# Patient Record
Sex: MALE | Race: BLACK OR AFRICAN AMERICAN | NOT HISPANIC OR LATINO
[De-identification: names, ages, dates, MRNs, and addresses within clinical notes are randomized per-mention and may not be internally consistent; named-entity substitution may affect disease eponyms.]

---

## 2018-05-24 PROBLEM — Z00.00 ENCOUNTER FOR PREVENTIVE HEALTH EXAMINATION: Status: ACTIVE | Noted: 2018-05-24

## 2018-06-11 ENCOUNTER — APPOINTMENT (OUTPATIENT)
Dept: ELECTROPHYSIOLOGY | Facility: CLINIC | Age: 58
End: 2018-06-11
Payer: COMMERCIAL

## 2018-06-11 VITALS — OXYGEN SATURATION: 99 % | BODY MASS INDEX: 28.72 KG/M2 | RESPIRATION RATE: 16 BRPM | WEIGHT: 231 LBS | HEIGHT: 75 IN

## 2018-06-11 PROCEDURE — 93282 PRGRMG EVAL IMPLANTABLE DFB: CPT

## 2018-06-11 PROCEDURE — 99205 OFFICE O/P NEW HI 60 MIN: CPT

## 2018-09-17 ENCOUNTER — APPOINTMENT (OUTPATIENT)
Dept: ELECTROPHYSIOLOGY | Facility: CLINIC | Age: 58
End: 2018-09-17
Payer: COMMERCIAL

## 2018-09-17 PROCEDURE — 93290 INTERROG DEV EVAL ICPMS IP: CPT

## 2018-09-17 PROCEDURE — 93282 PRGRMG EVAL IMPLANTABLE DFB: CPT

## 2018-12-17 ENCOUNTER — APPOINTMENT (OUTPATIENT)
Dept: ELECTROPHYSIOLOGY | Facility: CLINIC | Age: 58
End: 2018-12-17
Payer: COMMERCIAL

## 2018-12-17 PROCEDURE — 93295 DEV INTERROG REMOTE 1/2/MLT: CPT

## 2018-12-17 PROCEDURE — 93296 REM INTERROG EVL PM/IDS: CPT

## 2019-03-18 ENCOUNTER — APPOINTMENT (OUTPATIENT)
Dept: ELECTROPHYSIOLOGY | Facility: CLINIC | Age: 59
End: 2019-03-18
Payer: COMMERCIAL

## 2019-03-18 PROCEDURE — 93282 PRGRMG EVAL IMPLANTABLE DFB: CPT

## 2019-06-27 ENCOUNTER — APPOINTMENT (OUTPATIENT)
Dept: ELECTROPHYSIOLOGY | Facility: CLINIC | Age: 59
End: 2019-06-27
Payer: COMMERCIAL

## 2019-06-27 PROCEDURE — 93295 DEV INTERROG REMOTE 1/2/MLT: CPT

## 2019-06-27 PROCEDURE — 93296 REM INTERROG EVL PM/IDS: CPT

## 2019-09-30 ENCOUNTER — APPOINTMENT (OUTPATIENT)
Dept: ELECTROPHYSIOLOGY | Facility: CLINIC | Age: 59
End: 2019-09-30
Payer: COMMERCIAL

## 2019-09-30 PROCEDURE — 93282 PRGRMG EVAL IMPLANTABLE DFB: CPT

## 2019-12-31 ENCOUNTER — APPOINTMENT (OUTPATIENT)
Dept: ELECTROPHYSIOLOGY | Facility: CLINIC | Age: 59
End: 2019-12-31
Payer: COMMERCIAL

## 2019-12-31 PROCEDURE — 93295 DEV INTERROG REMOTE 1/2/MLT: CPT

## 2019-12-31 PROCEDURE — 93296 REM INTERROG EVL PM/IDS: CPT

## 2020-02-13 ENCOUNTER — APPOINTMENT (OUTPATIENT)
Dept: ELECTROPHYSIOLOGY | Facility: CLINIC | Age: 60
End: 2020-02-13
Payer: COMMERCIAL

## 2020-02-13 PROCEDURE — 93282 PRGRMG EVAL IMPLANTABLE DFB: CPT

## 2020-04-06 ENCOUNTER — APPOINTMENT (OUTPATIENT)
Dept: ELECTROPHYSIOLOGY | Facility: CLINIC | Age: 60
End: 2020-04-06

## 2020-05-13 ENCOUNTER — APPOINTMENT (OUTPATIENT)
Dept: ELECTROPHYSIOLOGY | Facility: CLINIC | Age: 60
End: 2020-05-13
Payer: COMMERCIAL

## 2020-05-13 PROCEDURE — 93295 DEV INTERROG REMOTE 1/2/MLT: CPT

## 2020-05-13 PROCEDURE — 93296 REM INTERROG EVL PM/IDS: CPT

## 2020-06-29 ENCOUNTER — APPOINTMENT (OUTPATIENT)
Dept: ELECTROPHYSIOLOGY | Facility: CLINIC | Age: 60
End: 2020-06-29

## 2020-08-17 ENCOUNTER — APPOINTMENT (OUTPATIENT)
Dept: ELECTROPHYSIOLOGY | Facility: CLINIC | Age: 60
End: 2020-08-17
Payer: COMMERCIAL

## 2020-08-17 PROCEDURE — 93296 REM INTERROG EVL PM/IDS: CPT

## 2020-08-17 PROCEDURE — 93295 DEV INTERROG REMOTE 1/2/MLT: CPT

## 2020-11-03 ENCOUNTER — INPATIENT (INPATIENT)
Facility: HOSPITAL | Age: 60
LOS: 3 days | Discharge: HOME | End: 2020-11-07
Attending: STUDENT IN AN ORGANIZED HEALTH CARE EDUCATION/TRAINING PROGRAM | Admitting: STUDENT IN AN ORGANIZED HEALTH CARE EDUCATION/TRAINING PROGRAM
Payer: COMMERCIAL

## 2020-11-03 VITALS
TEMPERATURE: 98 F | HEART RATE: 67 BPM | HEIGHT: 72 IN | WEIGHT: 229.94 LBS | SYSTOLIC BLOOD PRESSURE: 138 MMHG | OXYGEN SATURATION: 100 % | RESPIRATION RATE: 16 BRPM | DIASTOLIC BLOOD PRESSURE: 49 MMHG

## 2020-11-03 DIAGNOSIS — Z01.84 ENCOUNTER FOR ANTIBODY RESPONSE EXAMINATION: ICD-10-CM

## 2020-11-03 DIAGNOSIS — R76.0 RAISED ANTIBODY TITER: ICD-10-CM

## 2020-11-03 DIAGNOSIS — K42.9 UMBILICAL HERNIA WITHOUT OBSTRUCTION OR GANGRENE: ICD-10-CM

## 2020-11-03 DIAGNOSIS — Z86.19 PERSONAL HISTORY OF OTHER INFECTIOUS AND PARASITIC DISEASES: ICD-10-CM

## 2020-11-03 DIAGNOSIS — I50.9 HEART FAILURE, UNSPECIFIED: ICD-10-CM

## 2020-11-03 DIAGNOSIS — N17.9 ACUTE KIDNEY FAILURE, UNSPECIFIED: ICD-10-CM

## 2020-11-03 DIAGNOSIS — Z95.810 PRESENCE OF AUTOMATIC (IMPLANTABLE) CARDIAC DEFIBRILLATOR: ICD-10-CM

## 2020-11-03 DIAGNOSIS — Z95.0 PRESENCE OF CARDIAC PACEMAKER: Chronic | ICD-10-CM

## 2020-11-03 DIAGNOSIS — K35.30 ACUTE APPENDICITIS WITH LOCALIZED PERITONITIS, WITHOUT PERFORATION OR GANGRENE: ICD-10-CM

## 2020-11-03 DIAGNOSIS — I25.5 ISCHEMIC CARDIOMYOPATHY: ICD-10-CM

## 2020-11-03 DIAGNOSIS — R10.9 UNSPECIFIED ABDOMINAL PAIN: ICD-10-CM

## 2020-11-03 LAB
ALBUMIN SERPL ELPH-MCNC: 3.8 G/DL — SIGNIFICANT CHANGE UP (ref 3.3–5)
ALP SERPL-CCNC: 36 U/L — LOW (ref 40–120)
ALT FLD-CCNC: 27 U/L — SIGNIFICANT CHANGE UP (ref 12–78)
ANION GAP SERPL CALC-SCNC: 6 MMOL/L — SIGNIFICANT CHANGE UP (ref 5–17)
APTT BLD: 30.2 SEC — SIGNIFICANT CHANGE UP (ref 27.5–35.5)
AST SERPL-CCNC: 40 U/L — HIGH (ref 15–37)
BILIRUB SERPL-MCNC: 0.5 MG/DL — SIGNIFICANT CHANGE UP (ref 0.2–1.2)
BLD GP AB SCN SERPL QL: SIGNIFICANT CHANGE UP
BUN SERPL-MCNC: 10 MG/DL — SIGNIFICANT CHANGE UP (ref 7–23)
CALCIUM SERPL-MCNC: 9.3 MG/DL — SIGNIFICANT CHANGE UP (ref 8.5–10.1)
CHLORIDE SERPL-SCNC: 103 MMOL/L — SIGNIFICANT CHANGE UP (ref 96–108)
CO2 SERPL-SCNC: 27 MMOL/L — SIGNIFICANT CHANGE UP (ref 22–31)
CREAT SERPL-MCNC: 1.04 MG/DL — SIGNIFICANT CHANGE UP (ref 0.5–1.3)
GLUCOSE SERPL-MCNC: 147 MG/DL — HIGH (ref 70–99)
HCT VFR BLD CALC: 32.8 % — LOW (ref 39–50)
HGB BLD-MCNC: 10.6 G/DL — LOW (ref 13–17)
INR BLD: 1.15 RATIO — SIGNIFICANT CHANGE UP (ref 0.88–1.16)
LACTATE SERPL-SCNC: 2 MMOL/L — SIGNIFICANT CHANGE UP (ref 0.7–2)
LIDOCAIN IGE QN: 216 U/L — SIGNIFICANT CHANGE UP (ref 73–393)
MCHC RBC-ENTMCNC: 27.5 PG — SIGNIFICANT CHANGE UP (ref 27–34)
MCHC RBC-ENTMCNC: 32.3 GM/DL — SIGNIFICANT CHANGE UP (ref 32–36)
MCV RBC AUTO: 85.2 FL — SIGNIFICANT CHANGE UP (ref 80–100)
NRBC # BLD: 0 /100 WBCS — SIGNIFICANT CHANGE UP (ref 0–0)
PLATELET # BLD AUTO: 172 K/UL — SIGNIFICANT CHANGE UP (ref 150–400)
POTASSIUM SERPL-MCNC: 4.6 MMOL/L — SIGNIFICANT CHANGE UP (ref 3.5–5.3)
POTASSIUM SERPL-SCNC: 4.6 MMOL/L — SIGNIFICANT CHANGE UP (ref 3.5–5.3)
PROT SERPL-MCNC: 8.4 GM/DL — HIGH (ref 6–8.3)
PROTHROM AB SERPL-ACNC: 13.3 SEC — SIGNIFICANT CHANGE UP (ref 10.6–13.6)
RBC # BLD: 3.85 M/UL — LOW (ref 4.2–5.8)
RBC # FLD: 15 % — HIGH (ref 10.3–14.5)
SARS-COV-2 RNA SPEC QL NAA+PROBE: SIGNIFICANT CHANGE UP
SODIUM SERPL-SCNC: 136 MMOL/L — SIGNIFICANT CHANGE UP (ref 135–145)
WBC # BLD: 8.03 K/UL — SIGNIFICANT CHANGE UP (ref 3.8–10.5)
WBC # FLD AUTO: 8.03 K/UL — SIGNIFICANT CHANGE UP (ref 3.8–10.5)

## 2020-11-03 PROCEDURE — 93010 ELECTROCARDIOGRAM REPORT: CPT

## 2020-11-03 PROCEDURE — 93306 TTE W/DOPPLER COMPLETE: CPT | Mod: 26,59

## 2020-11-03 PROCEDURE — 71045 X-RAY EXAM CHEST 1 VIEW: CPT | Mod: 26

## 2020-11-03 PROCEDURE — 74177 CT ABD & PELVIS W/CONTRAST: CPT | Mod: 26,MA

## 2020-11-03 PROCEDURE — 99285 EMERGENCY DEPT VISIT HI MDM: CPT

## 2020-11-03 RX ORDER — LISINOPRIL 2.5 MG/1
10 TABLET ORAL DAILY
Refills: 0 | Status: DISCONTINUED | OUTPATIENT
Start: 2020-11-03 | End: 2020-11-07

## 2020-11-03 RX ORDER — ONDANSETRON 8 MG/1
4 TABLET, FILM COATED ORAL ONCE
Refills: 0 | Status: COMPLETED | OUTPATIENT
Start: 2020-11-03 | End: 2020-11-03

## 2020-11-03 RX ORDER — PIPERACILLIN AND TAZOBACTAM 4; .5 G/20ML; G/20ML
3.38 INJECTION, POWDER, LYOPHILIZED, FOR SOLUTION INTRAVENOUS ONCE
Refills: 0 | Status: DISCONTINUED | OUTPATIENT
Start: 2020-11-03 | End: 2020-11-03

## 2020-11-03 RX ORDER — ONDANSETRON 8 MG/1
4 TABLET, FILM COATED ORAL EVERY 6 HOURS
Refills: 0 | Status: DISCONTINUED | OUTPATIENT
Start: 2020-11-03 | End: 2020-11-07

## 2020-11-03 RX ORDER — SODIUM CHLORIDE 9 MG/ML
1000 INJECTION INTRAMUSCULAR; INTRAVENOUS; SUBCUTANEOUS ONCE
Refills: 0 | Status: COMPLETED | OUTPATIENT
Start: 2020-11-03 | End: 2020-11-03

## 2020-11-03 RX ORDER — SPIRONOLACTONE 25 MG/1
25 TABLET, FILM COATED ORAL DAILY
Refills: 0 | Status: DISCONTINUED | OUTPATIENT
Start: 2020-11-03 | End: 2020-11-07

## 2020-11-03 RX ORDER — PIPERACILLIN AND TAZOBACTAM 4; .5 G/20ML; G/20ML
3.38 INJECTION, POWDER, LYOPHILIZED, FOR SOLUTION INTRAVENOUS ONCE
Refills: 0 | Status: COMPLETED | OUTPATIENT
Start: 2020-11-03 | End: 2020-11-03

## 2020-11-03 RX ORDER — LISINOPRIL 2.5 MG/1
0 TABLET ORAL
Qty: 0 | Refills: 0 | DISCHARGE

## 2020-11-03 RX ORDER — ACETAMINOPHEN 500 MG
650 TABLET ORAL EVERY 6 HOURS
Refills: 0 | Status: DISCONTINUED | OUTPATIENT
Start: 2020-11-03 | End: 2020-11-07

## 2020-11-03 RX ORDER — CARVEDILOL PHOSPHATE 80 MG/1
12.5 CAPSULE, EXTENDED RELEASE ORAL EVERY 12 HOURS
Refills: 0 | Status: DISCONTINUED | OUTPATIENT
Start: 2020-11-03 | End: 2020-11-07

## 2020-11-03 RX ORDER — FUROSEMIDE 40 MG
20 TABLET ORAL DAILY
Refills: 0 | Status: DISCONTINUED | OUTPATIENT
Start: 2020-11-03 | End: 2020-11-07

## 2020-11-03 RX ORDER — HEPARIN SODIUM 5000 [USP'U]/ML
5000 INJECTION INTRAVENOUS; SUBCUTANEOUS EVERY 12 HOURS
Refills: 0 | Status: DISCONTINUED | OUTPATIENT
Start: 2020-11-03 | End: 2020-11-07

## 2020-11-03 RX ORDER — SODIUM CHLORIDE 9 MG/ML
1000 INJECTION, SOLUTION INTRAVENOUS
Refills: 0 | Status: DISCONTINUED | OUTPATIENT
Start: 2020-11-03 | End: 2020-11-07

## 2020-11-03 RX ORDER — MORPHINE SULFATE 50 MG/1
4 CAPSULE, EXTENDED RELEASE ORAL ONCE
Refills: 0 | Status: DISCONTINUED | OUTPATIENT
Start: 2020-11-03 | End: 2020-11-03

## 2020-11-03 RX ORDER — FENTANYL CITRATE 50 UG/ML
50 INJECTION INTRAVENOUS ONCE
Refills: 0 | Status: DISCONTINUED | OUTPATIENT
Start: 2020-11-03 | End: 2020-11-03

## 2020-11-03 RX ORDER — PIPERACILLIN AND TAZOBACTAM 4; .5 G/20ML; G/20ML
3.38 INJECTION, POWDER, LYOPHILIZED, FOR SOLUTION INTRAVENOUS EVERY 8 HOURS
Refills: 0 | Status: DISCONTINUED | OUTPATIENT
Start: 2020-11-03 | End: 2020-11-07

## 2020-11-03 RX ORDER — CARVEDILOL PHOSPHATE 80 MG/1
0 CAPSULE, EXTENDED RELEASE ORAL
Qty: 0 | Refills: 0 | DISCHARGE

## 2020-11-03 RX ORDER — FUROSEMIDE 40 MG
0 TABLET ORAL
Qty: 0 | Refills: 0 | DISCHARGE

## 2020-11-03 RX ORDER — SPIRONOLACTONE 25 MG/1
0 TABLET, FILM COATED ORAL
Qty: 0 | Refills: 0 | DISCHARGE

## 2020-11-03 RX ADMIN — MORPHINE SULFATE 4 MILLIGRAM(S): 50 CAPSULE, EXTENDED RELEASE ORAL at 13:00

## 2020-11-03 RX ADMIN — SODIUM CHLORIDE 75 MILLILITER(S): 9 INJECTION, SOLUTION INTRAVENOUS at 15:10

## 2020-11-03 RX ADMIN — FENTANYL CITRATE 50 MICROGRAM(S): 50 INJECTION INTRAVENOUS at 14:16

## 2020-11-03 RX ADMIN — HEPARIN SODIUM 5000 UNIT(S): 5000 INJECTION INTRAVENOUS; SUBCUTANEOUS at 17:29

## 2020-11-03 RX ADMIN — Medication 650 MILLIGRAM(S): at 21:53

## 2020-11-03 RX ADMIN — ONDANSETRON 4 MILLIGRAM(S): 8 TABLET, FILM COATED ORAL at 10:39

## 2020-11-03 RX ADMIN — MORPHINE SULFATE 4 MILLIGRAM(S): 50 CAPSULE, EXTENDED RELEASE ORAL at 12:26

## 2020-11-03 RX ADMIN — SODIUM CHLORIDE 50 MILLILITER(S): 9 INJECTION, SOLUTION INTRAVENOUS at 22:53

## 2020-11-03 RX ADMIN — PIPERACILLIN AND TAZOBACTAM 25 GRAM(S): 4; .5 INJECTION, POWDER, LYOPHILIZED, FOR SOLUTION INTRAVENOUS at 21:07

## 2020-11-03 RX ADMIN — PIPERACILLIN AND TAZOBACTAM 200 GRAM(S): 4; .5 INJECTION, POWDER, LYOPHILIZED, FOR SOLUTION INTRAVENOUS at 15:12

## 2020-11-03 RX ADMIN — LISINOPRIL 10 MILLIGRAM(S): 2.5 TABLET ORAL at 18:52

## 2020-11-03 RX ADMIN — SODIUM CHLORIDE 1000 MILLILITER(S): 9 INJECTION INTRAMUSCULAR; INTRAVENOUS; SUBCUTANEOUS at 10:39

## 2020-11-03 RX ADMIN — MORPHINE SULFATE 4 MILLIGRAM(S): 50 CAPSULE, EXTENDED RELEASE ORAL at 11:02

## 2020-11-03 RX ADMIN — Medication 650 MILLIGRAM(S): at 22:53

## 2020-11-03 RX ADMIN — SODIUM CHLORIDE 75 MILLILITER(S): 9 INJECTION, SOLUTION INTRAVENOUS at 21:07

## 2020-11-03 RX ADMIN — CARVEDILOL PHOSPHATE 12.5 MILLIGRAM(S): 80 CAPSULE, EXTENDED RELEASE ORAL at 18:52

## 2020-11-03 RX ADMIN — SPIRONOLACTONE 25 MILLIGRAM(S): 25 TABLET, FILM COATED ORAL at 18:52

## 2020-11-03 RX ADMIN — Medication 20 MILLIGRAM(S): at 18:52

## 2020-11-03 RX ADMIN — MORPHINE SULFATE 4 MILLIGRAM(S): 50 CAPSULE, EXTENDED RELEASE ORAL at 10:40

## 2020-11-03 NOTE — H&P ADULT - ATTENDING COMMENTS
I saw and examined the patient at bedside, who has RLQ tenderness, VSS. I reviewed the laboratory and imaging findings which are consistent with acute appendicitis. However the patient has pertinent history of heart failure, with last reported EF in 20s (workup performed at outside institution). I discussed the findings with the patient and his wife, and at this time the patient would like to pursue non-operative treatment with IV ABx to avoid surgery, however we discussed the fact that should his condition worsen, he will likely require surgery, which given his cardiac history, would likely have to be open. The patient stated he understood, was agreeable to this plan, and all questions from both the patient and his wife were answered.

## 2020-11-03 NOTE — ED PROVIDER NOTE - CARE PLAN
Principal Discharge DX:	Appendicitis  Secondary Diagnosis:	Congestive heart failure, unspecified HF chronicity, unspecified heart failure type

## 2020-11-03 NOTE — H&P ADULT - ASSESSMENT
Patient is a 59 yo male with pmhx CHF with AICD implanted 2013 who presented to the ED with epigastric and diffuse lower abdominal pain x 1 day. Surgery consulted for possible appendicitis. CT showed appendix dilated (up to 1.2 cm), fluid-filled with mild augmented enhancement and mild periappendiceal stranding possible appendicolith, suspicious for acute appendicitis. CT also showed moderate to large stool burden transverse and right colon. Patient is afebrile, VSS, no leukocytosis, no lactic acidosis.     As discussed with Dr. Donis,  - Admit to surgery  - Cardiology consult, Dr. Osborne to see patient   - NPO/IV fluid  - Zosyn  - Serial abdominal exams  - Preop for possible appendectomy should symptoms worsen   - TTE as per cardiology recommendation   - Resume home medications

## 2020-11-03 NOTE — H&P ADULT - NSHPSOCIALHISTORY_GEN_ALL_CORE
Patient lives with wife. Drinks alcohol socially. Denies history of smoking, denies recreational drug use. Patient works in housekeeping.

## 2020-11-03 NOTE — ED PROVIDER NOTE - NSFOLLOWUPINSTRUCTIONS_ED_ALL_ED_FT
EKG with LBBB not meeting sgarbossas criteria. patient with no CP or SOB indicative of ACS. w/u significant for acute appendicitis. d/w surgery will admit for definitive managemnt for acute appendicitis

## 2020-11-03 NOTE — ED PROVIDER NOTE - CLINICAL SUMMARY MEDICAL DECISION MAKING FREE TEXT BOX
Will evaluate for likely intraabdominal pathology, including obstruction and infection w/labs and imaging.

## 2020-11-03 NOTE — ED ADULT NURSE NOTE - OBJECTIVE STATEMENT
pt presents in bed 5 on cardiac monitoring c/o abdominal pain since around 130am. Brought in by wife- woke her up at 3am- - fixed him guido/lemon tea. small emesis around 7am. LBM x2 days. Went to urgent care this am was sent to ED. Denies chest pain, no acute distress noted or reported. PIV present - medicated as ordered. Wife at bedside- tx plan explained. verbalizes understanding

## 2020-11-03 NOTE — H&P ADULT - NSICDXPASTMEDICALHX_GEN_ALL_CORE_FT
PAST MEDICAL HISTORY:  Congestive heart failure, unspecified HF chronicity, unspecified heart failure type

## 2020-11-03 NOTE — ED PROVIDER NOTE - OBJECTIVE STATEMENT
Pt is a 60 year old male w/PMH of CHF w/implanted defibrillator, no prior abdominal surgeries, who presents to the ED for worsening abdominal pain since 3am this morning. Reports LBM x3 days ago. Denies fever/chills, CP, SOB, cough or dysuria.

## 2020-11-03 NOTE — H&P ADULT - NSHPREVIEWOFSYSTEMS_GEN_ALL_CORE
CONSTITUTIONAL: no fever and no chills.    EYES: no discharge, no irritation, no pain, no redness, and no visual changes  ENMT: Ears: no ear pain and no hearing problems. Nose: no nasal congestion and no nasal drainage. Mouth/Throat: no dysphagia, no hoarseness and no throat pain. Neck: no lumps, no pain, no stiffness and no swollen glands.  CARDIOVASCULAR: no chest pain and no edema.  RESPIRATORY: no chest pain, no cough.   GASTROINTESTINAL: + abdominal pain, +vomiting, no diarrhea.   MUSCULOSKELETAL: no back pain, no gout, no musculoskeletal pain, no neck pain, and no weakness.  SKIN: no abrasions, no jaundice, no lesions, no pruritis, and no rashes.  NEURO: no loss of consciousness, no gait abnormality, no headache, no sensory deficits, and no weakness.  PSYCHIATRIC: no known mental health issues.

## 2020-11-04 LAB
ANION GAP SERPL CALC-SCNC: 7 MMOL/L — SIGNIFICANT CHANGE UP (ref 5–17)
APPEARANCE UR: CLEAR — SIGNIFICANT CHANGE UP
BACTERIA # UR AUTO: ABNORMAL
BASOPHILS # BLD AUTO: 0.01 K/UL — SIGNIFICANT CHANGE UP (ref 0–0.2)
BASOPHILS NFR BLD AUTO: 0.1 % — SIGNIFICANT CHANGE UP (ref 0–2)
BILIRUB UR-MCNC: NEGATIVE — SIGNIFICANT CHANGE UP
BUN SERPL-MCNC: 9 MG/DL — SIGNIFICANT CHANGE UP (ref 7–23)
CALCIUM SERPL-MCNC: 8.6 MG/DL — SIGNIFICANT CHANGE UP (ref 8.5–10.1)
CHLORIDE SERPL-SCNC: 103 MMOL/L — SIGNIFICANT CHANGE UP (ref 96–108)
CO2 SERPL-SCNC: 29 MMOL/L — SIGNIFICANT CHANGE UP (ref 22–31)
COLOR SPEC: YELLOW — SIGNIFICANT CHANGE UP
CREAT SERPL-MCNC: 1.37 MG/DL — HIGH (ref 0.5–1.3)
DIFF PNL FLD: ABNORMAL
EOSINOPHIL # BLD AUTO: 0.06 K/UL — SIGNIFICANT CHANGE UP (ref 0–0.5)
EOSINOPHIL NFR BLD AUTO: 0.6 % — SIGNIFICANT CHANGE UP (ref 0–6)
EPI CELLS # UR: NEGATIVE — SIGNIFICANT CHANGE UP
GLUCOSE SERPL-MCNC: 112 MG/DL — HIGH (ref 70–99)
GLUCOSE UR QL: NEGATIVE MG/DL — SIGNIFICANT CHANGE UP
HCT VFR BLD CALC: 39.7 % — SIGNIFICANT CHANGE UP (ref 39–50)
HCV AB S/CO SERPL IA: 13.63 S/CO — HIGH (ref 0–0.99)
HCV AB SERPL-IMP: REACTIVE
HGB BLD-MCNC: 12.8 G/DL — LOW (ref 13–17)
IMM GRANULOCYTES NFR BLD AUTO: 0.4 % — SIGNIFICANT CHANGE UP (ref 0–1.5)
KETONES UR-MCNC: NEGATIVE — SIGNIFICANT CHANGE UP
LEUKOCYTE ESTERASE UR-ACNC: ABNORMAL
LYMPHOCYTES # BLD AUTO: 1.96 K/UL — SIGNIFICANT CHANGE UP (ref 1–3.3)
LYMPHOCYTES # BLD AUTO: 18.4 % — SIGNIFICANT CHANGE UP (ref 13–44)
MAGNESIUM SERPL-MCNC: 2.2 MG/DL — SIGNIFICANT CHANGE UP (ref 1.6–2.6)
MCHC RBC-ENTMCNC: 27.6 PG — SIGNIFICANT CHANGE UP (ref 27–34)
MCHC RBC-ENTMCNC: 32.2 GM/DL — SIGNIFICANT CHANGE UP (ref 32–36)
MCV RBC AUTO: 85.6 FL — SIGNIFICANT CHANGE UP (ref 80–100)
MONOCYTES # BLD AUTO: 0.9 K/UL — SIGNIFICANT CHANGE UP (ref 0–0.9)
MONOCYTES NFR BLD AUTO: 8.4 % — SIGNIFICANT CHANGE UP (ref 2–14)
NEUTROPHILS # BLD AUTO: 7.71 K/UL — HIGH (ref 1.8–7.4)
NEUTROPHILS NFR BLD AUTO: 72.1 % — SIGNIFICANT CHANGE UP (ref 43–77)
NITRITE UR-MCNC: NEGATIVE — SIGNIFICANT CHANGE UP
NRBC # BLD: 0 /100 WBCS — SIGNIFICANT CHANGE UP (ref 0–0)
PH UR: 5 — SIGNIFICANT CHANGE UP (ref 5–8)
PHOSPHATE SERPL-MCNC: 3.2 MG/DL — SIGNIFICANT CHANGE UP (ref 2.5–4.5)
PLATELET # BLD AUTO: 210 K/UL — SIGNIFICANT CHANGE UP (ref 150–400)
POTASSIUM SERPL-MCNC: 3.6 MMOL/L — SIGNIFICANT CHANGE UP (ref 3.5–5.3)
POTASSIUM SERPL-SCNC: 3.6 MMOL/L — SIGNIFICANT CHANGE UP (ref 3.5–5.3)
PROT UR-MCNC: NEGATIVE MG/DL — SIGNIFICANT CHANGE UP
RBC # BLD: 4.64 M/UL — SIGNIFICANT CHANGE UP (ref 4.2–5.8)
RBC # FLD: 15.1 % — HIGH (ref 10.3–14.5)
RBC CASTS # UR COMP ASSIST: ABNORMAL /HPF (ref 0–4)
SARS-COV-2 IGG SERPL QL IA: POSITIVE
SARS-COV-2 IGM SERPL IA-ACNC: 104 INDEX — HIGH
SODIUM SERPL-SCNC: 139 MMOL/L — SIGNIFICANT CHANGE UP (ref 135–145)
SP GR SPEC: 1.02 — SIGNIFICANT CHANGE UP (ref 1.01–1.02)
UROBILINOGEN FLD QL: NEGATIVE MG/DL — SIGNIFICANT CHANGE UP
WBC # BLD: 10.68 K/UL — HIGH (ref 3.8–10.5)
WBC # FLD AUTO: 10.68 K/UL — HIGH (ref 3.8–10.5)
WBC UR QL: ABNORMAL

## 2020-11-04 PROCEDURE — 99253 IP/OBS CNSLTJ NEW/EST LOW 45: CPT

## 2020-11-04 RX ADMIN — SODIUM CHLORIDE 110 MILLILITER(S): 9 INJECTION, SOLUTION INTRAVENOUS at 17:22

## 2020-11-04 RX ADMIN — HEPARIN SODIUM 5000 UNIT(S): 5000 INJECTION INTRAVENOUS; SUBCUTANEOUS at 05:21

## 2020-11-04 RX ADMIN — Medication 650 MILLIGRAM(S): at 13:14

## 2020-11-04 RX ADMIN — Medication 20 MILLIGRAM(S): at 05:21

## 2020-11-04 RX ADMIN — CARVEDILOL PHOSPHATE 12.5 MILLIGRAM(S): 80 CAPSULE, EXTENDED RELEASE ORAL at 17:22

## 2020-11-04 RX ADMIN — Medication 650 MILLIGRAM(S): at 22:44

## 2020-11-04 RX ADMIN — HEPARIN SODIUM 5000 UNIT(S): 5000 INJECTION INTRAVENOUS; SUBCUTANEOUS at 17:22

## 2020-11-04 RX ADMIN — PIPERACILLIN AND TAZOBACTAM 25 GRAM(S): 4; .5 INJECTION, POWDER, LYOPHILIZED, FOR SOLUTION INTRAVENOUS at 13:15

## 2020-11-04 RX ADMIN — LISINOPRIL 10 MILLIGRAM(S): 2.5 TABLET ORAL at 05:22

## 2020-11-04 RX ADMIN — PIPERACILLIN AND TAZOBACTAM 25 GRAM(S): 4; .5 INJECTION, POWDER, LYOPHILIZED, FOR SOLUTION INTRAVENOUS at 21:51

## 2020-11-04 RX ADMIN — SPIRONOLACTONE 25 MILLIGRAM(S): 25 TABLET, FILM COATED ORAL at 05:21

## 2020-11-04 RX ADMIN — CARVEDILOL PHOSPHATE 12.5 MILLIGRAM(S): 80 CAPSULE, EXTENDED RELEASE ORAL at 05:21

## 2020-11-04 RX ADMIN — Medication 650 MILLIGRAM(S): at 21:51

## 2020-11-04 RX ADMIN — PIPERACILLIN AND TAZOBACTAM 25 GRAM(S): 4; .5 INJECTION, POWDER, LYOPHILIZED, FOR SOLUTION INTRAVENOUS at 05:21

## 2020-11-04 NOTE — CHART NOTE - NSCHARTNOTEFT_GEN_A_CORE
LABS:                        12.8   10.68 )-----------( 210      ( 04 Nov 2020 07:50 )             39.7     11-04    139  |  103  |  9   ----------------------------<  112<H>  3.6   |  29  |  1.37<H>    Ca    8.6      04 Nov 2020 07:50  Phos  3.2     11-04  Mg     2.2     11-04    TPro  8.4<H>  /  Alb  3.8  /  TBili  0.5  /  DBili  x   /  AST  40<H>  /  ALT  27  /  AlkPhos  36<L>  11-03    PT/INR - ( 03 Nov 2020 10:44 )   PT: 13.3 sec;   INR: 1.15 ratio         PTT - ( 03 Nov 2020 10:44 )  PTT:30.2 sec      Hepatitis C Antibody Test (11.04.20 @ 10:46)    Hepatitis C Virus S/CO Ratio: 13.63 S/CO    Hepatitis C Virus Interpretation: Reactive  Hepatitis C AB  S/CO Ratio                        Interpretation  < 1.00                                   Non-Reactive  1.00 - 4.99                         Weakly-Reactive  >= 5.00                                Reactive  Non-Reactive: A person with a non-reactive HCV antibody result is  considered uninfected.  No further action is needed unless recent  infection is suspected.  In these cases, consider repeat testing later to  detect seroconversion..  Weakly-Reactive: HCV antibody test is abnormal, HCV RNA Qualitative test  will follow.  Reactive: HCV antibody test is abnormal, HCV RNA Qualitative test will  follow.  Note: HCV antibody testing is performed on the [employer]  system.        ECHO:< from: TTE Echo Complete w/o Contrast w/ Doppler (11.03.20 @ 17:42) >  Summary:   1. Left ventricular ejection fraction, by visual estimation, is 45 to 50%.   2. Technically fair study.   3. Mildly decreased global left ventricular systolic function.   4. Moderately increased left ventricular internal cavity size.   5. Spectral Doppler shows impaired relaxation pattern of left ventricular myocardial filling (Grade I diastolic dysfunction).   6. Normal right ventricular size and function.   7. Normal left atrial size.   8. Normal right atrial size.   9. There is no evidence of pericardial effusion.  10. Structurally normal mitral valve, with normal leaflet excursion.  11. Tracemitral valve regurgitation.  12. Mild tricuspid regurgitation.  13. Normal trileaflet aortic valve with normal opening.        Discussed with cardiologist increasing IVF as pt creatinine is elevated, she recommends increasing to rate of 110 for 24hrs and doing serial lung exams.  Cardio risk assessment  to follow recently resulted echo.

## 2020-11-04 NOTE — DIETITIAN INITIAL EVALUATION ADULT. - PERTINENT MEDS FT
MEDICATIONS  (STANDING):  carvedilol 12.5 milliGRAM(s) Oral every 12 hours  dextrose 5% + sodium chloride 0.9%. 1000 milliLiter(s) (50 mL/Hr) IV Continuous <Continuous>  furosemide    Tablet 20 milliGRAM(s) Oral daily  heparin   Injectable 5000 Unit(s) SubCutaneous every 12 hours  lisinopril 10 milliGRAM(s) Oral daily  piperacillin/tazobactam IVPB.. 3.375 Gram(s) IV Intermittent every 8 hours  spironolactone 25 milliGRAM(s) Oral daily    MEDICATIONS  (PRN):  acetaminophen   Tablet .. 650 milliGRAM(s) Oral every 6 hours PRN Temp greater or equal to 38C (100.4F), Mild Pain (1 - 3)  ondansetron Injectable 4 milliGRAM(s) IV Push every 6 hours PRN Nausea

## 2020-11-04 NOTE — CONSULT NOTE ADULT - SUBJECTIVE AND OBJECTIVE BOX
CARDIOLOGY CONSULT NOTE    Patient is a 60y Male with a known history of :  Congestive heart failure, unspecified HF chronicity, unspecified heart failure type [I50.9]    Acute appendicitis with localized peritonitis, without perforation, abscess, or gangrene [K35.30]      HPI:  59yo man with a PMH of likely non-ischemic CM s/p AICD implanted 2013 (per pt, never required stents and stress test ~2 yrs ago was nl); complaining of epigastric and diffuse lower abdominal pain.   Found to have acute appendicitis... being medically managed for now.  Feeling well from a cardiac standpoint.... denied chest pain/palpitations/dyspnea/syncope.   Patient follows with cardiologist Dr. Richardson [phone]/[place].         REVIEW OF SYSTEMS:    CONSTITUTIONAL: No fever, weight loss, or fatigue  EYES: No eye pain, visual disturbances, or discharge  ENMT:  No difficulty hearing, tinnitus, vertigo; No sinus or throat pain  NECK: No pain or stiffness  BREASTS: No pain, masses, or nipple discharge  RESPIRATORY: No cough, wheezing, chills or hemoptysis; No shortness of breath  CARDIOVASCULAR: No chest pain, palpitations, dizziness, or leg swelling  GASTROINTESTINAL: No abdominal or epigastric pain. No nausea, vomiting, or hematemesis; No diarrhea or constipation. No melena or hematochezia.  GENITOURINARY: No dysuria, frequency, hematuria, or incontinence  NEUROLOGICAL: No headaches, memory loss, loss of strength, numbness, or tremors  SKIN: No itching, burning, rashes, or lesions   LYMPH NODES: No enlarged glands  ENDOCRINE: No heat or cold intolerance; No hair loss  MUSCULOSKELETAL: No joint pain or swelling; No muscle, back, or extremity pain  PSYCHIATRIC: No depression, anxiety, mood swings, or difficulty sleeping  HEME/LYMPH: No easy bruising, or bleeding gums  ALLERGY AND IMMUNOLOGIC: No hives or eczema    MEDICATIONS  (STANDING):  carvedilol 12.5 milliGRAM(s) Oral every 12 hours  dextrose 5% + sodium chloride 0.9%. 1000 milliLiter(s) (50 mL/Hr) IV Continuous <Continuous>  furosemide    Tablet 20 milliGRAM(s) Oral daily  heparin   Injectable 5000 Unit(s) SubCutaneous every 12 hours  lisinopril 10 milliGRAM(s) Oral daily  piperacillin/tazobactam IVPB.. 3.375 Gram(s) IV Intermittent every 8 hours  spironolactone 25 milliGRAM(s) Oral daily    MEDICATIONS  (PRN):  acetaminophen   Tablet .. 650 milliGRAM(s) Oral every 6 hours PRN Temp greater or equal to 38C (100.4F), Mild Pain (1 - 3)  ondansetron Injectable 4 milliGRAM(s) IV Push every 6 hours PRN Nausea      ALLERGIES: No Known Allergies      FAMILY HISTORY:      PHYSICAL EXAMINATION:  -----------------------------  T(C): 37.1 (11-04-20 @ 06:21), Max: 38 (11-03-20 @ 20:45)  HR: 79 (11-04-20 @ 06:21) (68 - 79)  BP: 101/61 (11-04-20 @ 06:21) (100/51 - 134/63)  RR: 18 (11-04-20 @ 06:21) (12 - 18)  SpO2: 97% (11-04-20 @ 06:21) (97% - 100%)      Constitutional: well developed, normal appearance, well groomed, well nourished, no deformities and no acute distress.   Eyes: the conjunctiva exhibited no abnormalities and the eyelids demonstrated no xanthelasmas.   HEENT: normal oral mucosa, no oral pallor and no oral cyanosis.   Neck: normal jugular venous A waves present, normal jugular venous V waves present and no jugular venous luke A waves.   Pulmonary: no respiratory distress, normal respiratory rhythm and effort, no accessory muscle use and lungs were clear to auscultation bilaterally.   Cardiovascular: heart rate and rhythm were normal, normal S1 and S2 and no murmur, gallop, rub, heave or thrill are present.   Abdomen: mild abd pain  Musculoskeletal: the gait could not be assessed..   Extremities: no clubbing of the fingernails, no localized cyanosis, no petechial hemorrhages and no ischemic changes.   Skin: normal skin color and pigmentation, no rash, no venous stasis, no skin lesions, no skin ulcer and no xanthoma was observed.   Psychiatric: oriented to person, place, and time, the affect was normal, the mood was normal and not feeling anxious.       LABS:   --------  11-04    139  |  103  |  9   ----------------------------<  112<H>  3.6   |  29  |  1.37<H>    Ca    8.6      04 Nov 2020 07:50  Phos  3.2     11-04  Mg     2.2     11-04    TPro  8.4<H>  /  Alb  3.8  /  TBili  0.5  /  DBili  x   /  AST  40<H>  /  ALT  27  /  AlkPhos  36<L>  11-03                         12.8   10.68 )-----------( 210      ( 04 Nov 2020 07:50 )             39.7     PT/INR - ( 03 Nov 2020 10:44 )   PT: 13.3 sec;   INR: 1.15 ratio         PTT - ( 03 Nov 2020 10:44 )  PTT:30.2 sec          RADIOLOGY:  -----------------    ECG:  sinus 68bpm; LBBB

## 2020-11-04 NOTE — CHART NOTE - NSCHARTNOTEFT_GEN_A_CORE
AICD ( [employer]) interrogated.   -Single chamber ICD  -2.94V battery longevity  -No events since last check  -Full report in chart

## 2020-11-04 NOTE — PROGRESS NOTE ADULT - SUBJECTIVE AND OBJECTIVE BOX
SURGERY PROGRESS HPI:  Pt seen and examined at bedside. RLQ pain is improving from yesterday. Patient states that "it simmered down." Pt denies complaints. No acute events overnight. Pt denies nausea and vomiting. Passed flatus once overnight. No BM. Voiding well. Pt denies chest pain, SOB, dizziness, fever, chills.    Vital Signs Last 24 Hrs  T(C): 37.1 (04 Nov 2020 06:21), Max: 38 (03 Nov 2020 20:45)  T(F): 98.7 (04 Nov 2020 06:21), Max: 100.4 (03 Nov 2020 20:45)  HR: 79 (04 Nov 2020 06:21) (66 - 79)  BP: 101/61 (04 Nov 2020 06:21) (100/51 - 138/49)  BP(mean): --  RR: 18 (04 Nov 2020 06:21) (12 - 18)  SpO2: 97% (04 Nov 2020 06:21) (97% - 100%)      PHYSICAL EXAM:    GENERAL: NAD  CHEST/LUNG: Clear to ausculation, bilaterally   HEART: RRR S1S2  ABDOMEN: non distended, +BS, soft, RLQ tenderness  EXTREMITIES:  calf soft, non tender b/l    I&O's Detail    03 Nov 2020 07:01  -  04 Nov 2020 06:31  --------------------------------------------------------  IN:  Total IN: 0 mL    OUT:    Voided (mL): 200 mL  Total OUT: 200 mL    Total NET: -200 mL      LABS:                        10.6   8.03  )-----------( 172      ( 03 Nov 2020 10:44 )             32.8     11-03    136  |  103  |  10  ----------------------------<  147<H>  4.6   |  27  |  1.04    Ca    9.3      03 Nov 2020 10:44    TPro  8.4<H>  /  Alb  3.8  /  TBili  0.5  /  DBili  x   /  AST  40<H>  /  ALT  27  /  AlkPhos  36<L>  11-03    PT/INR - ( 03 Nov 2020 10:44 )   PT: 13.3 sec;   INR: 1.15 ratio      PTT - ( 03 Nov 2020 10:44 )  PTT:30.2 sec      Assessment: 61 yo male with pmhx CHF with AICD implanted 2013 with CT that showed appendix dilated (up to 1.2 cm), fluid-filled with mild augmented enhancement and mild periappendiceal stranding possible appendicolith, suspicious for acute appendicitis.   Tmax overnight: 100.4    Plan:  - Cardiology consult, Dr. Osborne to see patient. F/u echo results.  - NPO, IV hydration  - Zosyn  - Serial abdominal exams  - Preop for possible appendectomy should symptoms worsen   - Follow up AM labs  - Discussed with Dr. Donis

## 2020-11-04 NOTE — DIETITIAN INITIAL EVALUATION ADULT. - PERTINENT LABORATORY DATA
11-04 Na139 mmol/L Glu 112 mg/dL<H> K+ 3.6 mmol/L Cr  1.37 mg/dL<H> BUN 9 mg/dL 11-04 Phos 3.2 mg/dL 11-03 Alb 3.8 g/dL

## 2020-11-04 NOTE — DIETITIAN INITIAL EVALUATION ADULT. - OTHER INFO
Pt adm w/abdominal pain, R/O appendicitis. Pt had 1 episode of emesis PTA. Per chart pt currently NPO for possibility of appendectomy (if symptoms gets worse). Met w/pt at bedside, pt reports abdominal pain is improving. Pt denies N/V/D but reports constipation (states likely because of pain medications). PTA pt reports having "very strong appetite" and was eating well. Pt reports living with spouse who did the food-shopping and cooking. Pt reports not following any specific diets. Pt denies difficulty chewing/swallowing. Pt reports UBW of ~223# but states 2-2.5 years ago he used to weigh in 190's# but gained a lot of weight due to his new job. Diet education provided on Low Sodium, Heart Failure Nutrition therapy, healthy eating habits and encouraged compliance. Pt verbalized comprehension.

## 2020-11-04 NOTE — CONSULT NOTE ADULT - ASSESSMENT
59yo man with a PMH of likely non-ischemic CM s/p AICD implanted 2013 (per pt, never required stents and stress test ~2 yrs ago was nl); complaining of epigastric and diffuse lower abdominal pain.   Found to have acute appendicitis... being medically managed for now.  Feeling well from a cardiac standpoint.... denied chest pain/palpitations/dyspnea/syncope.   Patient follows with cardiologist Dr. Richardson [phone]/[place].   ECG:  sinus 68bpm; LBBB    Pt not in heart failure currently; no arrhthymias noted; no signs of ischemia.  -ICD interrogation and 2D echo pending  Clearance pending above  Cont coreg/furosemide/lisinopril/spironolactone; would add asa prior to d/c

## 2020-11-05 ENCOUNTER — TRANSCRIPTION ENCOUNTER (OUTPATIENT)
Age: 60
End: 2020-11-05

## 2020-11-05 LAB
ANION GAP SERPL CALC-SCNC: 5 MMOL/L — SIGNIFICANT CHANGE UP (ref 5–17)
BUN SERPL-MCNC: 10 MG/DL — SIGNIFICANT CHANGE UP (ref 7–23)
CALCIUM SERPL-MCNC: 8.7 MG/DL — SIGNIFICANT CHANGE UP (ref 8.5–10.1)
CHLORIDE SERPL-SCNC: 105 MMOL/L — SIGNIFICANT CHANGE UP (ref 96–108)
CO2 SERPL-SCNC: 29 MMOL/L — SIGNIFICANT CHANGE UP (ref 22–31)
CREAT SERPL-MCNC: 1.09 MG/DL — SIGNIFICANT CHANGE UP (ref 0.5–1.3)
GLUCOSE SERPL-MCNC: 99 MG/DL — SIGNIFICANT CHANGE UP (ref 70–99)
HCT VFR BLD CALC: 37.9 % — LOW (ref 39–50)
HGB BLD-MCNC: 12.5 G/DL — LOW (ref 13–17)
MAGNESIUM SERPL-MCNC: 2.3 MG/DL — SIGNIFICANT CHANGE UP (ref 1.6–2.6)
MCHC RBC-ENTMCNC: 27.5 PG — SIGNIFICANT CHANGE UP (ref 27–34)
MCHC RBC-ENTMCNC: 33 GM/DL — SIGNIFICANT CHANGE UP (ref 32–36)
MCV RBC AUTO: 83.5 FL — SIGNIFICANT CHANGE UP (ref 80–100)
NRBC # BLD: 0 /100 WBCS — SIGNIFICANT CHANGE UP (ref 0–0)
PHOSPHATE SERPL-MCNC: 2.9 MG/DL — SIGNIFICANT CHANGE UP (ref 2.5–4.5)
PLATELET # BLD AUTO: 204 K/UL — SIGNIFICANT CHANGE UP (ref 150–400)
POTASSIUM SERPL-MCNC: 3.5 MMOL/L — SIGNIFICANT CHANGE UP (ref 3.5–5.3)
POTASSIUM SERPL-SCNC: 3.5 MMOL/L — SIGNIFICANT CHANGE UP (ref 3.5–5.3)
RBC # BLD: 4.54 M/UL — SIGNIFICANT CHANGE UP (ref 4.2–5.8)
RBC # FLD: 14.9 % — HIGH (ref 10.3–14.5)
SODIUM SERPL-SCNC: 139 MMOL/L — SIGNIFICANT CHANGE UP (ref 135–145)
WBC # BLD: 7.63 K/UL — SIGNIFICANT CHANGE UP (ref 3.8–10.5)
WBC # FLD AUTO: 7.63 K/UL — SIGNIFICANT CHANGE UP (ref 3.8–10.5)

## 2020-11-05 PROCEDURE — 99233 SBSQ HOSP IP/OBS HIGH 50: CPT

## 2020-11-05 RX ADMIN — SPIRONOLACTONE 25 MILLIGRAM(S): 25 TABLET, FILM COATED ORAL at 06:07

## 2020-11-05 RX ADMIN — Medication 20 MILLIGRAM(S): at 06:07

## 2020-11-05 RX ADMIN — HEPARIN SODIUM 5000 UNIT(S): 5000 INJECTION INTRAVENOUS; SUBCUTANEOUS at 17:07

## 2020-11-05 RX ADMIN — PIPERACILLIN AND TAZOBACTAM 25 GRAM(S): 4; .5 INJECTION, POWDER, LYOPHILIZED, FOR SOLUTION INTRAVENOUS at 13:37

## 2020-11-05 RX ADMIN — PIPERACILLIN AND TAZOBACTAM 25 GRAM(S): 4; .5 INJECTION, POWDER, LYOPHILIZED, FOR SOLUTION INTRAVENOUS at 21:37

## 2020-11-05 RX ADMIN — LISINOPRIL 10 MILLIGRAM(S): 2.5 TABLET ORAL at 06:07

## 2020-11-05 RX ADMIN — CARVEDILOL PHOSPHATE 12.5 MILLIGRAM(S): 80 CAPSULE, EXTENDED RELEASE ORAL at 17:07

## 2020-11-05 RX ADMIN — PIPERACILLIN AND TAZOBACTAM 25 GRAM(S): 4; .5 INJECTION, POWDER, LYOPHILIZED, FOR SOLUTION INTRAVENOUS at 06:07

## 2020-11-05 RX ADMIN — HEPARIN SODIUM 5000 UNIT(S): 5000 INJECTION INTRAVENOUS; SUBCUTANEOUS at 06:07

## 2020-11-05 RX ADMIN — CARVEDILOL PHOSPHATE 12.5 MILLIGRAM(S): 80 CAPSULE, EXTENDED RELEASE ORAL at 06:07

## 2020-11-05 NOTE — PROGRESS NOTE ADULT - SUBJECTIVE AND OBJECTIVE BOX
Patient seen and examined at bedside resting comfortably.  States his pain is better.   +flatus/BM  Denies fever, chills, N/V/D, CP, SOB, dizziness, cough.    Vital Signs Last 24 Hrs  T(F): 98.5 (11-05-20 @ 10:16), Max: 98.9 (11-05-20 @ 00:14)  HR: 62 (11-05-20 @ 10:16)  BP: 100/63 (11-05-20 @ 10:16)  RR: 17 (11-05-20 @ 10:16)  SpO2: 98% (11-05-20 @ 10:16)    PHYSICAL EXAM:  GENERAL: Alert, NAD  CHEST/LUNG: Clear to auscultation bilaterally, respirations nonlabored  HEART: S1S2  ABDOMEN: + BS, soft, ttp in epigastric region and RLQ. With voluntary guarding in epigastric region. + rebound only in RLQ.    EXTREMITIES:  no calf tenderness, no edema    I&O's Detail    04 Nov 2020 07:01  -  05 Nov 2020 07:00  --------------------------------------------------------  IN:    Oral Fluid: 150 mL  Total IN: 150 mL    OUT:  Total OUT: 0 mL    Total NET: 150 mL      LABS:                        12.5   7.63  )-----------( 204      ( 05 Nov 2020 09:22 )             37.9     11-05    139  |  105  |  10  ----------------------------<  99  3.5   |  29  |  1.09    Ca    8.7      05 Nov 2020 09:22  Phos  2.9     11-05  Mg     2.3     11-05      RADIOLOGY & ADDITIONAL STUDIES:  TTE Echo Complete w/o Contrast w/ Doppler (11.03.20 @ 17:42)   EXAM:  ECHO TTE WO CON COMP W DOPP      Summary:   1. Left ventricular ejection fraction, by visual estimation, is 45 to 50%.   2. Technically fair study.   3. Mildly decreased global left ventricular systolic function.   4. Moderately increased left ventricular internal cavity size.   5. Spectral Doppler shows impaired relaxation pattern of left ventricular myocardial filling (Grade I diastolic dysfunction).   6. Normal right ventricular size and function.   7. Normal left atrial size.   8. Normal right atrial size.   9. There is no evidence of pericardial effusion.  10. Structurally normal mitral valve, with normal leaflet excursion.  11. Tracemitral valve regurgitation.  12. Mild tricuspid regurgitation.  13. Normal trileaflet aortic valve with normal opening.      ASSESSMENT   59yo M with PMH of CHF with implanted AICD (2013) with CT that showed appendix dilated (up to 1.2 cm), fluid-filled with mild augmented enhancement and mild periappendiceal stranding possible appendicolith, suspicious for acute appendicitis.   Leukocytosis resolved. Afebrile at this time.    PLAN  - As per cardiology, continue tele. AICD interrogated with no reportable events. Echo with EF of 45-50%. Patient is not currently in heart failure  - continue Zosyn  - NPO, continue IVF  - trend WBCs and temp  - DVT ppx with heparin and ICDs; OOB/ambulate  - will pre-op if patient's condition worsens  - will discuss with Dr. Donsi

## 2020-11-05 NOTE — PROGRESS NOTE ADULT - ATTENDING COMMENTS
Mr. Genao was examined. Overall he is improving. His leukocytosis has resolved on zosyn. He has been afebrile for 24 hours. He has been cleared by our cardiology colleagues for surgical intervention. On exam he is afebrile, hemodynamically stable, mild right lower quadrant pain. TTE and Cardiology recommendations reviewed. Patient desires appendectomy on this admission. He states he has had similar pain in the months prior. Plan per Dr. Donis.

## 2020-11-05 NOTE — PROGRESS NOTE ADULT - ASSESSMENT
59yo male with a PMH of likely non-ischemic CM s/p AICD implanted 2013 (per pt, never required stents and stress test ~2 yrs ago was nl); complaining of epigastric and diffuse lower abdominal pain.   suspicious for acute appendicitis, being medically managed for now.  Feeling well from a cardiac standpoint. denied chest pain/palpitations/dyspnea/syncope.   Patient follows with cardiologist Dr. Richardson [phone]/[place].   ECG:  sinus 68bpm; LBBB  Pt not in heart failure currently; no arrhthymias noted; no signs of ischemia.    Plan  Cont on tele  ICD interrogated, no reportable events  Echo with EF 45-50%, Grade I DD, mild TR  Cont coreg/furosemide/lisinopril/spironolactone  Monitor renal function and electrolytes, supplement electrolytes as needed   would add asa prior to d/c   for possible appendectomy should symptoms worsen  as per surgery  NPO, on IV hydration, cautious with  fluids in setting of mildly  decreased global left ventricular systolic function 61yo male with a PMH of likely non-ischemic CM s/p AICD implanted 2013 (per pt, never required stents and stress test ~2 yrs ago was nl); complaining of epigastric and diffuse lower abdominal pain.   suspicious for acute appendicitis, being medically managed for now.  Feeling well from a cardiac standpoint. denied chest pain/palpitations/dyspnea/syncope.   Patient follows with cardiologist Dr. Richardson [phone]/[place].   ECG:  sinus 68bpm; LBBB  Pt not in heart failure currently; no arrhthymias noted; no signs of ischemia.    Plan  Cont on tele  ICD interrogated, no reportable events  Echo with EF 45-50%, Grade I DD, mild TR; no significant valvular issues.  Cont coreg/furosemide/lisinopril/spironolactone  Monitor renal function and electrolytes, supplement electrolytes as needed   would add asa prior to d/c   for possible appendectomy should symptoms worsen  as per surgery.  He remains at intermediate cardiovascular risk for an intermediate risk procedure.  He is currently cardiovascularly optimized, and does not require any further cardiac testing prior to appendectomy.    NPO, on IV hydration, cautious with  fluids in setting of mildly  decreased global left ventricular systolic function

## 2020-11-05 NOTE — PROGRESS NOTE ADULT - SUBJECTIVE AND OBJECTIVE BOX
Patient is a 60y old  Male who presents with a chief complaint of Abdominal pain, r/o appendicitis (04 Nov 2020 11:21)    PAST MEDICAL & SURGICAL HISTORY:  Congestive heart failure, unspecified HF chronicity, unspecified heart failure type    AICD- Medtronic 2013    INTERVAL HISTORY: Resting in bed, in no distress   	  MEDICATIONS:  MEDICATIONS  (STANDING):  carvedilol 12.5 milliGRAM(s) Oral every 12 hours  dextrose 5% + sodium chloride 0.9%. 1000 milliLiter(s) (110 mL/Hr) IV Continuous <Continuous>  furosemide    Tablet 20 milliGRAM(s) Oral daily  heparin   Injectable 5000 Unit(s) SubCutaneous every 12 hours  lisinopril 10 milliGRAM(s) Oral daily  piperacillin/tazobactam IVPB.. 3.375 Gram(s) IV Intermittent every 8 hours  spironolactone 25 milliGRAM(s) Oral daily    MEDICATIONS  (PRN):  acetaminophen   Tablet .. 650 milliGRAM(s) Oral every 6 hours PRN Temp greater or equal to 38C (100.4F), Mild Pain (1 - 3)  ondansetron Injectable 4 milliGRAM(s) IV Push every 6 hours PRN Nausea    Vitals:  T(F): 98.5 (11-05-20 @ 10:16), Max: 98.9 (11-05-20 @ 00:14)  HR: 62 (11-05-20 @ 10:16) (61 - 65)  BP: 100/63 (11-05-20 @ 10:16) (100/63 - 114/55)  RR: 17 (11-05-20 @ 10:16) (17 - 18)  SpO2: 98% (11-05-20 @ 10:16) (95% - 98%)    11-04 @ 07:01  -  11-05 @ 07:00  --------------------------------------------------------  IN:    Oral Fluid: 150 mL  Total IN: 150 mL    OUT:  Total OUT: 0 mL    Total NET: 150 mL    Weight (kg): 101.3 (11-03 @ 20:45)  BMI (kg/m2): 30.3 (11-03 @ 20:45)    PHYSICAL EXAM:  Neuro: Awake, responsive  CV: S1 S2 RRR  Lungs: CTABL  GI: Soft, BS +, ND, NT  Extremities: No edema    TELEMETRY: RSR    RADIOLOGY: < from: CT Abdomen and Pelvis w/ IV Cont (11.03.20 @ 13:16) >  Suspicious for acute appendicitis    < end of copied text >    < from: Xray Chest 1 View- PORTABLE-Urgent (Xray Chest 1 View- PORTABLE-Urgent .) (11.03.20 @ 10:26) >  Left-sided AICD in situ.  Thoracic aortic atheromatous changes and ectasia are present.  The heart is top normal in size.  No mediastinal or hilar abnormality.  The lungs are clear. No pleural effusion or pneumothorax  No acute bony finding.    IMPRESSION:  Negative for acute pulmonary process.    < end of copied text >    DIAGNOSTIC TESTING:    [x ] Echocardiogram: < from: TTE Echo Complete w/o Contrast w/ Doppler (11.03.20 @ 17:42) >  Left Ventricle: The left ventricular internal cavity size is moderately increased.  Global LV systolic function was mildly decreased. Left ventricular ejection fraction, by visual estimation, is 45 to 50%. Abnormal (paradoxical) septal motion, consistent with RV pacemaker. Spectral Doppler shows impaired relaxation pattern of left ventricular myocardial filling (Grade I diastolic dysfunction).  Right Ventricle: Normal right ventricular size and function.  Left Atrium: Normal left atrial size.  Right Atrium: Normal right atrial size.  Pericardium: There is no evidence of pericardial effusion.  Mitral Valve: Structurally normal mitral valve, with normal leaflet excursion. Mitral leaflet mobility is normal. Trace mitral valve regurgitation is seen.  Tricuspid Valve: Structurally normal tricuspid valve, with normal leaflet excursion. The tricuspid valve is normal in structure. Mild tricuspid regurgitation is visualized.  Aortic Valve: Normal trileaflet aortic valve with normal opening. The aortic valve is trileaflet. Peak transaortic gradient equals 11.5 mmHg, mean transaortic gradient equals 6.4 mmHg, the calculated aortic valve area equals 3.17 cm² by the continuity equation consistent with normally opening aortic valve. No evidence of aortic valve regurgitation is seen.  Pulmonic Valve: The pulmonic valve was not well visualized. Trace pulmonic valveregurgitation.  Aorta: Aortic root measured at Sinus of Valsalva is normal.  Venous: The inferior vena cava was normal sized, with respiratory size variation greater than 50%.  Additional Comments: A pacer wire is visualized in the right atrium and right ventricle.  In comparison to the previous echocardiogram(s): There are no prior studies on this patient for comparison purposes.      Summary:   1. Left ventricular ejection fraction, by visual estimation, is 45 to 50%.   2. Technically fair study.   3. Mildly decreased global left ventricular systolic function.   4. Moderately increased left ventricular internal cavity size.   5. Spectral Doppler shows impaired relaxation pattern of left ventricular myocardial filling (Grade I diastolic dysfunction).   6. Normal right ventricular size and function.   7. Normal left atrial size.   8. Normal right atrial size.   9. There is no evidence of pericardial effusion.  10. Structurally normal mitral valve, with normal leaflet excursion.  11. Tracemitral valve regurgitation.  12. Mild tricuspid regurgitation.  13. Normal trileaflet aortic valve with normal opening.    < end of copied text >    LABS:	 	    05 Nov 2020 09:22    139    |  105    |  10     ----------------------------<  99     3.5     |  29     |  1.09   04 Nov 2020 07:50    139    |  103    |  9      ----------------------------<  112    3.6     |  29     |  1.37   03 Nov 2020 10:44    136    |  103    |  10     ----------------------------<  147    4.6     |  27     |  1.04     Ca    8.7        05 Nov 2020 09:22  Phos  2.9       05 Nov 2020 09:22  Mg     2.3       05 Nov 2020 09:22                        12.5   7.63  )-----------( 204      ( 05 Nov 2020 09:22 )             37.9 ,                       12.8   10.68 )-----------( 210      ( 04 Nov 2020 07:50 )             39.7 ,                       10.6   8.03  )-----------( 172      ( 03 Nov 2020 10:44 )             32.8     INR: 1.15 ratio (11-03 @ 10:44)           Patient is a 60y old  Male who presents with a chief complaint of Abdominal pain, r/o appendicitis (04 Nov 2020 11:21)    PAST MEDICAL & SURGICAL HISTORY:  Congestive heart failure, unspecified HF chronicity, unspecified heart failure type    AICD- Medtronic 2013    INTERVAL HISTORY: Resting in bed, in no distress, still with abd pain  	  MEDICATIONS:  MEDICATIONS  (STANDING):  carvedilol 12.5 milliGRAM(s) Oral every 12 hours  dextrose 5% + sodium chloride 0.9%. 1000 milliLiter(s) (110 mL/Hr) IV Continuous <Continuous>  furosemide    Tablet 20 milliGRAM(s) Oral daily  heparin   Injectable 5000 Unit(s) SubCutaneous every 12 hours  lisinopril 10 milliGRAM(s) Oral daily  piperacillin/tazobactam IVPB.. 3.375 Gram(s) IV Intermittent every 8 hours  spironolactone 25 milliGRAM(s) Oral daily    MEDICATIONS  (PRN):  acetaminophen   Tablet .. 650 milliGRAM(s) Oral every 6 hours PRN Temp greater or equal to 38C (100.4F), Mild Pain (1 - 3)  ondansetron Injectable 4 milliGRAM(s) IV Push every 6 hours PRN Nausea    Vitals:  T(F): 98.5 (11-05-20 @ 10:16), Max: 98.9 (11-05-20 @ 00:14)  HR: 62 (11-05-20 @ 10:16) (61 - 65)  BP: 100/63 (11-05-20 @ 10:16) (100/63 - 114/55)  RR: 17 (11-05-20 @ 10:16) (17 - 18)  SpO2: 98% (11-05-20 @ 10:16) (95% - 98%)    11-04 @ 07:01  -  11-05 @ 07:00  --------------------------------------------------------  IN:    Oral Fluid: 150 mL  Total IN: 150 mL    OUT:  Total OUT: 0 mL    Total NET: 150 mL    Weight (kg): 101.3 (11-03 @ 20:45)  BMI (kg/m2): 30.3 (11-03 @ 20:45)    PHYSICAL EXAM:  Neuro: Awake, responsive  CV: S1 S2 RRR  Lungs: CTABL  GI: Soft, BS +, ND, + Tenderness   Extremities: No edema    TELEMETRY: RSR    RADIOLOGY: < from: CT Abdomen and Pelvis w/ IV Cont (11.03.20 @ 13:16) >  Suspicious for acute appendicitis    < end of copied text >    < from: Xray Chest 1 View- PORTABLE-Urgent (Xray Chest 1 View- PORTABLE-Urgent .) (11.03.20 @ 10:26) >  Left-sided AICD in situ.  Thoracic aortic atheromatous changes and ectasia are present.  The heart is top normal in size.  No mediastinal or hilar abnormality.  The lungs are clear. No pleural effusion or pneumothorax  No acute bony finding.    IMPRESSION:  Negative for acute pulmonary process.    < end of copied text >    DIAGNOSTIC TESTING:    [x ] Echocardiogram: < from: TTE Echo Complete w/o Contrast w/ Doppler (11.03.20 @ 17:42) >  Left Ventricle: The left ventricular internal cavity size is moderately increased.  Global LV systolic function was mildly decreased. Left ventricular ejection fraction, by visual estimation, is 45 to 50%. Abnormal (paradoxical) septal motion, consistent with RV pacemaker. Spectral Doppler shows impaired relaxation pattern of left ventricular myocardial filling (Grade I diastolic dysfunction).  Right Ventricle: Normal right ventricular size and function.  Left Atrium: Normal left atrial size.  Right Atrium: Normal right atrial size.  Pericardium: There is no evidence of pericardial effusion.  Mitral Valve: Structurally normal mitral valve, with normal leaflet excursion. Mitral leaflet mobility is normal. Trace mitral valve regurgitation is seen.  Tricuspid Valve: Structurally normal tricuspid valve, with normal leaflet excursion. The tricuspid valve is normal in structure. Mild tricuspid regurgitation is visualized.  Aortic Valve: Normal trileaflet aortic valve with normal opening. The aortic valve is trileaflet. Peak transaortic gradient equals 11.5 mmHg, mean transaortic gradient equals 6.4 mmHg, the calculated aortic valve area equals 3.17 cm² by the continuity equation consistent with normally opening aortic valve. No evidence of aortic valve regurgitation is seen.  Pulmonic Valve: The pulmonic valve was not well visualized. Trace pulmonic valveregurgitation.  Aorta: Aortic root measured at Sinus of Valsalva is normal.  Venous: The inferior vena cava was normal sized, with respiratory size variation greater than 50%.  Additional Comments: A pacer wire is visualized in the right atrium and right ventricle.  In comparison to the previous echocardiogram(s): There are no prior studies on this patient for comparison purposes.      Summary:   1. Left ventricular ejection fraction, by visual estimation, is 45 to 50%.   2. Technically fair study.   3. Mildly decreased global left ventricular systolic function.   4. Moderately increased left ventricular internal cavity size.   5. Spectral Doppler shows impaired relaxation pattern of left ventricular myocardial filling (Grade I diastolic dysfunction).   6. Normal right ventricular size and function.   7. Normal left atrial size.   8. Normal right atrial size.   9. There is no evidence of pericardial effusion.  10. Structurally normal mitral valve, with normal leaflet excursion.  11. Tracemitral valve regurgitation.  12. Mild tricuspid regurgitation.  13. Normal trileaflet aortic valve with normal opening.    < end of copied text >    LABS:	 	    05 Nov 2020 09:22    139    |  105    |  10     ----------------------------<  99     3.5     |  29     |  1.09   04 Nov 2020 07:50    139    |  103    |  9      ----------------------------<  112    3.6     |  29     |  1.37   03 Nov 2020 10:44    136    |  103    |  10     ----------------------------<  147    4.6     |  27     |  1.04     Ca    8.7        05 Nov 2020 09:22  Phos  2.9       05 Nov 2020 09:22  Mg     2.3       05 Nov 2020 09:22                        12.5   7.63  )-----------( 204      ( 05 Nov 2020 09:22 )             37.9 ,                       12.8   10.68 )-----------( 210      ( 04 Nov 2020 07:50 )             39.7 ,                       10.6   8.03  )-----------( 172      ( 03 Nov 2020 10:44 )             32.8     INR: 1.15 ratio (11-03 @ 10:44)

## 2020-11-06 ENCOUNTER — RESULT REVIEW (OUTPATIENT)
Age: 60
End: 2020-11-06

## 2020-11-06 ENCOUNTER — TRANSCRIPTION ENCOUNTER (OUTPATIENT)
Age: 60
End: 2020-11-06

## 2020-11-06 LAB
ANION GAP SERPL CALC-SCNC: 7 MMOL/L — SIGNIFICANT CHANGE UP (ref 5–17)
BUN SERPL-MCNC: 10 MG/DL — SIGNIFICANT CHANGE UP (ref 7–23)
CALCIUM SERPL-MCNC: 8.9 MG/DL — SIGNIFICANT CHANGE UP (ref 8.5–10.1)
CHLORIDE SERPL-SCNC: 105 MMOL/L — SIGNIFICANT CHANGE UP (ref 96–108)
CO2 SERPL-SCNC: 27 MMOL/L — SIGNIFICANT CHANGE UP (ref 22–31)
CREAT SERPL-MCNC: 1.15 MG/DL — SIGNIFICANT CHANGE UP (ref 0.5–1.3)
GLUCOSE SERPL-MCNC: 94 MG/DL — SIGNIFICANT CHANGE UP (ref 70–99)
HCT VFR BLD CALC: 39.4 % — SIGNIFICANT CHANGE UP (ref 39–50)
HGB BLD-MCNC: 13 G/DL — SIGNIFICANT CHANGE UP (ref 13–17)
MAGNESIUM SERPL-MCNC: 2.5 MG/DL — SIGNIFICANT CHANGE UP (ref 1.6–2.6)
MCHC RBC-ENTMCNC: 27.8 PG — SIGNIFICANT CHANGE UP (ref 27–34)
MCHC RBC-ENTMCNC: 33 GM/DL — SIGNIFICANT CHANGE UP (ref 32–36)
MCV RBC AUTO: 84.4 FL — SIGNIFICANT CHANGE UP (ref 80–100)
NRBC # BLD: 0 /100 WBCS — SIGNIFICANT CHANGE UP (ref 0–0)
PHOSPHATE SERPL-MCNC: 3.2 MG/DL — SIGNIFICANT CHANGE UP (ref 2.5–4.5)
PLATELET # BLD AUTO: 211 K/UL — SIGNIFICANT CHANGE UP (ref 150–400)
POTASSIUM SERPL-MCNC: 3.6 MMOL/L — SIGNIFICANT CHANGE UP (ref 3.5–5.3)
POTASSIUM SERPL-SCNC: 3.6 MMOL/L — SIGNIFICANT CHANGE UP (ref 3.5–5.3)
RBC # BLD: 4.67 M/UL — SIGNIFICANT CHANGE UP (ref 4.2–5.8)
RBC # FLD: 14.8 % — HIGH (ref 10.3–14.5)
SODIUM SERPL-SCNC: 139 MMOL/L — SIGNIFICANT CHANGE UP (ref 135–145)
WBC # BLD: 7.03 K/UL — SIGNIFICANT CHANGE UP (ref 3.8–10.5)
WBC # FLD AUTO: 7.03 K/UL — SIGNIFICANT CHANGE UP (ref 3.8–10.5)

## 2020-11-06 PROCEDURE — 44970 LAPAROSCOPY APPENDECTOMY: CPT | Mod: AS

## 2020-11-06 PROCEDURE — 99232 SBSQ HOSP IP/OBS MODERATE 35: CPT

## 2020-11-06 PROCEDURE — 88304 TISSUE EXAM BY PATHOLOGIST: CPT | Mod: 26

## 2020-11-06 PROCEDURE — 44970 LAPAROSCOPY APPENDECTOMY: CPT

## 2020-11-06 RX ORDER — OXYCODONE HYDROCHLORIDE 5 MG/1
10 TABLET ORAL EVERY 4 HOURS
Refills: 0 | Status: DISCONTINUED | OUTPATIENT
Start: 2020-11-06 | End: 2020-11-07

## 2020-11-06 RX ORDER — HYDROMORPHONE HYDROCHLORIDE 2 MG/ML
1 INJECTION INTRAMUSCULAR; INTRAVENOUS; SUBCUTANEOUS
Refills: 0 | Status: DISCONTINUED | OUTPATIENT
Start: 2020-11-06 | End: 2020-11-06

## 2020-11-06 RX ORDER — SODIUM CHLORIDE 9 MG/ML
1000 INJECTION, SOLUTION INTRAVENOUS
Refills: 0 | Status: DISCONTINUED | OUTPATIENT
Start: 2020-11-06 | End: 2020-11-06

## 2020-11-06 RX ORDER — ONDANSETRON 8 MG/1
4 TABLET, FILM COATED ORAL ONCE
Refills: 0 | Status: DISCONTINUED | OUTPATIENT
Start: 2020-11-06 | End: 2020-11-06

## 2020-11-06 RX ORDER — HYDROMORPHONE HYDROCHLORIDE 2 MG/ML
0.5 INJECTION INTRAMUSCULAR; INTRAVENOUS; SUBCUTANEOUS
Refills: 0 | Status: DISCONTINUED | OUTPATIENT
Start: 2020-11-06 | End: 2020-11-06

## 2020-11-06 RX ORDER — OXYCODONE HYDROCHLORIDE 5 MG/1
5 TABLET ORAL EVERY 4 HOURS
Refills: 0 | Status: DISCONTINUED | OUTPATIENT
Start: 2020-11-06 | End: 2020-11-07

## 2020-11-06 RX ADMIN — SODIUM CHLORIDE 65 MILLILITER(S): 9 INJECTION, SOLUTION INTRAVENOUS at 08:09

## 2020-11-06 RX ADMIN — PIPERACILLIN AND TAZOBACTAM 25 GRAM(S): 4; .5 INJECTION, POWDER, LYOPHILIZED, FOR SOLUTION INTRAVENOUS at 21:58

## 2020-11-06 RX ADMIN — HYDROMORPHONE HYDROCHLORIDE 0.5 MILLIGRAM(S): 2 INJECTION INTRAMUSCULAR; INTRAVENOUS; SUBCUTANEOUS at 18:39

## 2020-11-06 RX ADMIN — PIPERACILLIN AND TAZOBACTAM 25 GRAM(S): 4; .5 INJECTION, POWDER, LYOPHILIZED, FOR SOLUTION INTRAVENOUS at 14:21

## 2020-11-06 RX ADMIN — SODIUM CHLORIDE 75 MILLILITER(S): 9 INJECTION, SOLUTION INTRAVENOUS at 18:36

## 2020-11-06 RX ADMIN — SODIUM CHLORIDE 65 MILLILITER(S): 9 INJECTION, SOLUTION INTRAVENOUS at 21:58

## 2020-11-06 RX ADMIN — PIPERACILLIN AND TAZOBACTAM 25 GRAM(S): 4; .5 INJECTION, POWDER, LYOPHILIZED, FOR SOLUTION INTRAVENOUS at 06:06

## 2020-11-06 NOTE — DISCHARGE NOTE PROVIDER - NSDCFUSCHEDAPPT_GEN_ALL_CORE_FT
REA DIAZ ; 11/12/2020 ; [facility] [place] [facility] [id number]  REA DIAZ ; 11/16/2020 ; NP [facility] [address]

## 2020-11-06 NOTE — PROGRESS NOTE ADULT - SUBJECTIVE AND OBJECTIVE BOX
Team Surgery Preop Note    Patient is a 60y old  Male who presents with a chief complaint of Abdominal pain, r/o appendicitis (2020 12:52)    Diagnosis: Acute appendicitis  Procedure: Laparoscopic Appendectomy, possible open  Surgeon: Dr. Deleon                          13.0   7.03  )-----------( 211      ( 2020 07:35 )             39.4     11-06    139  |  105  |  10  ----------------------------<  94  3.6   |  27  |  1.15    Ca    8.9      2020 07:35  Phos  3.2     11-06  Mg     2.5     11-06        Urinalysis Basic - ( 2020 18:40 )    Color: Yellow / Appearance: Clear / S.020 / pH: x  Gluc: x / Ketone: Negative  / Bili: Negative / Urobili: Negative mg/dL   Blood: x / Protein: Negative mg/dL / Nitrite: Negative   Leuk Esterase: Trace / RBC: 3-5 /HPF / WBC 6-10   Sq Epi: x / Non Sq Epi: Negative / Bacteria: Occasional    A/P: 60M admitted with acute appendicitis with localized peritonitis scheduled for Lap appy today.     [X]  COVID NEG  [X]  Zosyn  [X ] Type & Screen  [X ] CBC  [X ] BMP  [X ] PT/PTT/INR  [X ] Urinalysis  [X ] Chest X-ray  [X ] EKG  [ X] NPO/IVF  [X ] Consent  [X ] Clearance: Cardio clearance in chart  [X ] Added on to OR Schedule

## 2020-11-06 NOTE — DISCHARGE NOTE PROVIDER - NSDCFUADDINST_GEN_ALL_CORE_FT
OK to shower after 48hrs, allow soapy water to run over abdomen and pat dry. Steri strips will fall off by themselves, do not scrub wounds or remove steri strips.     Do not drive if taking prescribed narcotic pain medications. OK to use OTC Tylenol and/or Ibuprofen as needed for mild-moderate pain.     Please notify MD sooner or return to the ER with any new or worsening symptoms, uncontrollable pain, foul smelling discharge or drainage from wound, excessive bleeding or swelling, fever >101, chest pain, shortness of breath, abdominal pain, nausea/vomiting, or other concerns/problems.

## 2020-11-06 NOTE — PROGRESS NOTE ADULT - SUBJECTIVE AND OBJECTIVE BOX
Patient seen and examined at bedside resting comfortably.  States he is feeling well and his abdominal pain is improving. Is on board for his lap appendectomy scheduled today.  +flatus/BM  Denies fever, chills, N/V/D, CP, SOB.    Vital Signs Last 24 Hrs  T(F): 98.2 (11-06-20 @ 10:15), Max: 99.3 (11-05-20 @ 16:01)  HR: 62 (11-06-20 @ 10:15)  BP: 126/73 (11-06-20 @ 10:15)  RR: 18 (11-06-20 @ 10:15)  SpO2: 96% (11-06-20 @ 10:15)    PHYSICAL EXAM:  GENERAL: Alert, NAD  CHEST/LUNG: respirations nonlabored  HEART: S1S2  ABDOMEN: soft, ttp in LLQ, nondistended.  EXTREMITIES: no calf tenderness, no edema    I&O's Detail    05 Nov 2020 07:01  -  06 Nov 2020 07:00  --------------------------------------------------------  IN:    dextrose 5% + sodium chloride 0.9%: 780 mL    IV PiggyBack: 100 mL  Total IN: 880 mL    OUT:    Oral Fluid: 0 mL  Total OUT: 0 mL    Total NET: 880 mL      LABS:                        13.0   7.03  )-----------( 211      ( 06 Nov 2020 07:35 )             39.4     11-06    139  |  105  |  10  ----------------------------<  94  3.6   |  27  |  1.15    Ca    8.9      06 Nov 2020 07:35  Phos  3.2     11-06  Mg     2.5     11-06    ASSESSMENT   59yo M with PMH of CHF with implanted AICD (2013), a/w acute appendicitis.   Afebrile.     PLAN  - Booked for OR today with Dr. Deleon for laparoscopy appendectomy  - continue Zosyn  - NPO, continue IVF  - trend WBCs and temp  - continue cardiac management as per Cardiology  - DVT ppx with heparin and ICDs; OOB/ambulate  - will discuss with Dr. Donis

## 2020-11-06 NOTE — PROGRESS NOTE ADULT - SUBJECTIVE AND OBJECTIVE BOX
Surgical PA Post-op Evaluation  Patient seen and examined bedside resting comfortably, denies n/v, no CP/SOB. Voided, Tolerating clear liquids.   Abdominal pain is well controlled with meds.      Vital Signs Last 24 Hrs  T(C): 37.1 (06 Nov 2020 20:30), Max: 37.1 (05 Nov 2020 23:49)  T(F): 98.8 (06 Nov 2020 20:30), Max: 98.8 (06 Nov 2020 20:30)  HR: 64 (06 Nov 2020 20:30) (54 - 82)  BP: 136/66 (06 Nov 2020 20:30) (115/65 - 143/71)  BP(mean): --  RR: 12 (06 Nov 2020 20:30) (11 - 24)  SpO2: 96% (06 Nov 2020 20:30) (94% - 99%)  I&O's Summary    05 Nov 2020 07:01  -  06 Nov 2020 07:00  --------------------------------------------------------  IN: 880 mL / OUT: 0 mL / NET: 880 mL      PHYSICAL EXAM:  GENERAL: NAD  CHEST/LUNG: Clear to ausculation, bilaterally   HEART: RRR S1S2  ABDOMEN: ND, +BS, soft, +incisional tenderness, incisions with dressing c/d/i  EXTREMITIES:  calf soft, non tender b/l      Assessment: 59 yo male with pmhx CHF s/p AICD with defib,admitted with acute appendicitis, now POD#0 s/p Lap Appy    Plan:  - cont clear liquids, advance as tolerated  - Zosyn  - cont cardio comanagement  -cont dvt/gi ppx  -oob to walking  -incentive spirometry  -F/U AM labs  -d/c planning in am

## 2020-11-06 NOTE — DISCHARGE NOTE PROVIDER - NSDCFUADDAPPT_GEN_ALL_CORE_FT
Please call the office (#[phone]) to schedule an appointment for follow up with Dr. Mccarthy in 7-10 days.  Follow up with your primary care physician regarding your recent hospitalization.

## 2020-11-06 NOTE — DISCHARGE NOTE PROVIDER - NSDCMRMEDTOKEN_GEN_ALL_CORE_FT
carvedilol: 12.5 milligram(s) orally 2 times a day  furosemide: 20 milligram(s) orally once a day  lisinopril: 10 milligram(s) orally once a day  spironolactone: 25 milligram(s) orally once a day   carvedilol: 12.5 milligram(s) orally 2 times a day  furosemide: 20 milligram(s) orally once a day  lisinopril: 10 milligram(s) orally once a day  oxycodone-acetaminophen 5 mg-325 mg oral tablet: 1 tab(s) orally every 6 hours, As Needed -for severe pain MDD:4   spironolactone: 25 milligram(s) orally once a day

## 2020-11-06 NOTE — BRIEF OPERATIVE NOTE - NSICDXBRIEFPREOP_GEN_ALL_CORE_FT
PRE-OP DIAGNOSIS:  Acute appendicitis with localized peritonitis 06-Nov-2020 17:23:23  Audrey Wyatt

## 2020-11-06 NOTE — DISCHARGE NOTE PROVIDER - CARE PROVIDER_API CALL
Shelbie Deleon  SURGERY  [address] [facility] [place] [address]  Phone: [phone]  Fax: [phone]  Follow Up Time:

## 2020-11-06 NOTE — PROGRESS NOTE ADULT - ASSESSMENT
61yo male with a PMH of likely non-ischemic CM s/p AICD implanted 2013 (per pt, never required stents and stress test ~2 yrs ago was nl); complaining of epigastric and diffuse lower abdominal pain.   suspicious for acute appendicitis, being medically managed for now.  Feeling well from a cardiac standpoint. denied chest pain/palpitations/dyspnea/syncope.   Patient follows with cardiologist Dr. Richardson [phone]/[place].   ECG:  sinus 68bpm; LBBB  Pt not in heart failure currently; no arrhthymias noted; no signs of ischemia.    Plan  Cont on tele  ICD interrogated, no reportable events  Echo with EF 45-50%, Grade I DD, mild TR; no significant valvular issues.  Cont coreg/furosemide/lisinopril/spironolactone  Monitor renal function and electrolytes, supplement electrolytes as needed   would add asa prior to d/c   For appendectomy today as per surgery.    NPO, on IV hydration, cautious with  fluids in setting of mildly  decreased global left ventricular systolic function

## 2020-11-06 NOTE — DISCHARGE NOTE PROVIDER - NSDCCPCAREPLAN_GEN_ALL_CORE_FT
PRINCIPAL DISCHARGE DIAGNOSIS  Diagnosis: Appendicitis  Assessment and Plan of Treatment:       SECONDARY DISCHARGE DIAGNOSES  Diagnosis: Congestive heart failure, unspecified HF chronicity, unspecified heart failure type  Assessment and Plan of Treatment:

## 2020-11-06 NOTE — BRIEF OPERATIVE NOTE - NSICDXBRIEFPOSTOP_GEN_ALL_CORE_FT
Bedside report received from Harpal Gallegos RN                  Assessment, Background, Procedure summary, Intake/Output, MAR, and recent results discussed. Care assumed. POST-OP DIAGNOSIS:  Acute appendicitis 06-Nov-2020 17:23:40  Audrey Wyatt

## 2020-11-06 NOTE — PROGRESS NOTE ADULT - SUBJECTIVE AND OBJECTIVE BOX
Patient is a 60y old  Male who presents with a chief complaint of Abdominal pain, r/o appendicitis (05 Nov 2020 11:37)      PAST MEDICAL & SURGICAL HISTORY:  Congestive heart failure, unspecified HF chronicity, unspecified heart failure type    AICD 2013 ( Medtronic)    INTERVAL HISTORY:  resting in bed, in no distress, denies any chest pain or sob, + abd pain    	  MEDICATIONS:  MEDICATIONS  (STANDING):  carvedilol 12.5 milliGRAM(s) Oral every 12 hours  dextrose 5% + sodium chloride 0.9%. 1000 milliLiter(s) (65 mL/Hr) IV Continuous <Continuous>  furosemide    Tablet 20 milliGRAM(s) Oral daily  heparin   Injectable 5000 Unit(s) SubCutaneous every 12 hours  lisinopril 10 milliGRAM(s) Oral daily  piperacillin/tazobactam IVPB.. 3.375 Gram(s) IV Intermittent every 8 hours  spironolactone 25 milliGRAM(s) Oral daily    MEDICATIONS  (PRN):  acetaminophen   Tablet .. 650 milliGRAM(s) Oral every 6 hours PRN Temp greater or equal to 38C (100.4F), Mild Pain (1 - 3)  ondansetron Injectable 4 milliGRAM(s) IV Push every 6 hours PRN Nausea    Vitals:  T(F): 98.2 (11-06-20 @ 10:15), Max: 99.3 (11-05-20 @ 16:01)  HR: 62 (11-06-20 @ 10:15) (60 - 80)  BP: 126/73 (11-06-20 @ 10:15) (114/66 - 126/73)  RR: 18 (11-06-20 @ 10:15) (17 - 18)  SpO2: 96% (11-06-20 @ 10:15) (96% - 98%)    11-05 @ 07:01  -  11-06 @ 07:00  --------------------------------------------------------  IN:    dextrose 5% + sodium chloride 0.9%: 780 mL    IV PiggyBack: 100 mL  Total IN: 880 mL    OUT:    Oral Fluid: 0 mL  Total OUT: 0 mL    Total NET: 880 mL    Weight (kg): 101.3 (11-03 @ 20:45)    PHYSICAL EXAM:  Neuro: Awake, responsive  CV: S1 S2 RRR  Lungs: CTABL  GI: Soft, BS +, ND, + tenderness   Extremities: No edema    TELEMETRY: sinus 50s    RADIOLOGY:    < from: CT Abdomen and Pelvis w/ IV Cont (11.03.20 @ 13:16) >    IMPRESSION:  Suspicious for acute appendicitis    < end of copied text >    DIAGNOSTIC TESTING:    [x ] Echocardiogram:   < from: TTE Echo Complete w/o Contrast w/ Doppler (11.03.20 @ 17:42) >  Left Ventricle: The left ventricular internal cavity size is moderately increased.  Global LV systolic function was mildly decreased. Left ventricular ejection fraction, by visual estimation, is 45 to 50%. Abnormal (paradoxical) septal motion, consistent with RV pacemaker. Spectral Doppler shows impaired relaxation pattern of left ventricular myocardial filling (Grade I diastolic dysfunction).  Right Ventricle: Normal right ventricular size and function.  Left Atrium: Normal left atrial size.  Right Atrium: Normal right atrial size.  Pericardium: There is no evidence of pericardial effusion.  Mitral Valve: Structurally normal mitral valve, with normal leaflet excursion. Mitral leaflet mobility is normal. Trace mitral valve regurgitation is seen.  Tricuspid Valve: Structurally normal tricuspid valve, with normal leaflet excursion. The tricuspid valve is normal in structure. Mild tricuspid regurgitation is visualized.  Aortic Valve: Normal trileaflet aortic valve with normal opening. The aortic valve is trileaflet. Peak transaortic gradient equals 11.5 mmHg, mean transaortic gradient equals 6.4 mmHg, the calculated aortic valve area equals 3.17 cm² by the continuity equation consistent with normally opening aortic valve. No evidence of aortic valve regurgitation is seen.  Pulmonic Valve: The pulmonic valve was not well visualized. Trace pulmonic valveregurgitation.  Aorta: Aortic root measured at Sinus of Valsalva is normal.  Venous: The inferior vena cava was normal sized, with respiratory size variation greater than 50%.  Additional Comments: A pacer wire is visualized in the right atrium and right ventricle.  In comparison to the previous echocardiogram(s): There are no prior studies on this patient for comparison purposes.      Summary:   1. Left ventricular ejection fraction, by visual estimation, is 45 to 50%.   2. Technically fair study.   3. Mildly decreased global left ventricular systolic function.   4. Moderately increased left ventricular internal cavity size.   5. Spectral Doppler shows impaired relaxation pattern of left ventricular myocardial filling (Grade I diastolic dysfunction).   6. Normal right ventricular size and function.   7. Normal left atrial size.   8. Normal right atrial size.   9. There is no evidence of pericardial effusion.  10. Structurally normal mitral valve, with normal leaflet excursion.  11. Tracemitral valve regurgitation.  12. Mild tricuspid regurgitation.  13. Normal trileaflet aortic valve with normal opening.    < end of copied text >    LABS:	 	    06 Nov 2020 07:35    139    |  105    |  10     ----------------------------<  94     3.6     |  27     |  1.15   05 Nov 2020 09:22    139    |  105    |  10     ----------------------------<  99     3.5     |  29     |  1.09   04 Nov 2020 07:50    139    |  103    |  9      ----------------------------<  112    3.6     |  29     |  1.37     Ca    8.9        06 Nov 2020 07:35  Phos  3.2       06 Nov 2020 07:35  Mg     2.5       06 Nov 2020 07:35                       13.0   7.03  )-----------( 211      ( 06 Nov 2020 07:35 )             39.4 ,                       12.5   7.63  )-----------( 204      ( 05 Nov 2020 09:22 )             37.9 ,                       12.8   10.68 )-----------( 210      ( 04 Nov 2020 07:50 )             39.7

## 2020-11-07 ENCOUNTER — TRANSCRIPTION ENCOUNTER (OUTPATIENT)
Age: 60
End: 2020-11-07

## 2020-11-07 VITALS
OXYGEN SATURATION: 97 % | RESPIRATION RATE: 18 BRPM | TEMPERATURE: 99 F | SYSTOLIC BLOOD PRESSURE: 110 MMHG | HEART RATE: 89 BPM | DIASTOLIC BLOOD PRESSURE: 70 MMHG

## 2020-11-07 LAB
ANION GAP SERPL CALC-SCNC: 6 MMOL/L — SIGNIFICANT CHANGE UP (ref 5–17)
BASOPHILS # BLD AUTO: 0.01 K/UL — SIGNIFICANT CHANGE UP (ref 0–0.2)
BASOPHILS NFR BLD AUTO: 0.1 % — SIGNIFICANT CHANGE UP (ref 0–2)
BUN SERPL-MCNC: 10 MG/DL — SIGNIFICANT CHANGE UP (ref 7–23)
CALCIUM SERPL-MCNC: 8.6 MG/DL — SIGNIFICANT CHANGE UP (ref 8.5–10.1)
CHLORIDE SERPL-SCNC: 105 MMOL/L — SIGNIFICANT CHANGE UP (ref 96–108)
CO2 SERPL-SCNC: 24 MMOL/L — SIGNIFICANT CHANGE UP (ref 22–31)
CREAT SERPL-MCNC: 1.04 MG/DL — SIGNIFICANT CHANGE UP (ref 0.5–1.3)
EOSINOPHIL # BLD AUTO: 0.05 K/UL — SIGNIFICANT CHANGE UP (ref 0–0.5)
EOSINOPHIL NFR BLD AUTO: 0.6 % — SIGNIFICANT CHANGE UP (ref 0–6)
GLUCOSE SERPL-MCNC: 101 MG/DL — HIGH (ref 70–99)
HCT VFR BLD CALC: 41.2 % — SIGNIFICANT CHANGE UP (ref 39–50)
HCV RNA FLD QL NAA+PROBE: SIGNIFICANT CHANGE UP
HGB BLD-MCNC: 13.2 G/DL — SIGNIFICANT CHANGE UP (ref 13–17)
IMM GRANULOCYTES NFR BLD AUTO: 0.3 % — SIGNIFICANT CHANGE UP (ref 0–1.5)
LYMPHOCYTES # BLD AUTO: 1.5 K/UL — SIGNIFICANT CHANGE UP (ref 1–3.3)
LYMPHOCYTES # BLD AUTO: 18.8 % — SIGNIFICANT CHANGE UP (ref 13–44)
MCHC RBC-ENTMCNC: 27 PG — SIGNIFICANT CHANGE UP (ref 27–34)
MCHC RBC-ENTMCNC: 32 GM/DL — SIGNIFICANT CHANGE UP (ref 32–36)
MCV RBC AUTO: 84.3 FL — SIGNIFICANT CHANGE UP (ref 80–100)
MONOCYTES # BLD AUTO: 0.73 K/UL — SIGNIFICANT CHANGE UP (ref 0–0.9)
MONOCYTES NFR BLD AUTO: 9.1 % — SIGNIFICANT CHANGE UP (ref 2–14)
NEUTROPHILS # BLD AUTO: 5.67 K/UL — SIGNIFICANT CHANGE UP (ref 1.8–7.4)
NEUTROPHILS NFR BLD AUTO: 71.1 % — SIGNIFICANT CHANGE UP (ref 43–77)
NRBC # BLD: 0 /100 WBCS — SIGNIFICANT CHANGE UP (ref 0–0)
PLATELET # BLD AUTO: 238 K/UL — SIGNIFICANT CHANGE UP (ref 150–400)
POTASSIUM SERPL-MCNC: 3.6 MMOL/L — SIGNIFICANT CHANGE UP (ref 3.5–5.3)
POTASSIUM SERPL-SCNC: 3.6 MMOL/L — SIGNIFICANT CHANGE UP (ref 3.5–5.3)
RBC # BLD: 4.89 M/UL — SIGNIFICANT CHANGE UP (ref 4.2–5.8)
RBC # FLD: 14.3 % — SIGNIFICANT CHANGE UP (ref 10.3–14.5)
SODIUM SERPL-SCNC: 135 MMOL/L — SIGNIFICANT CHANGE UP (ref 135–145)
WBC # BLD: 7.98 K/UL — SIGNIFICANT CHANGE UP (ref 3.8–10.5)
WBC # FLD AUTO: 7.98 K/UL — SIGNIFICANT CHANGE UP (ref 3.8–10.5)

## 2020-11-07 RX ORDER — OXYCODONE AND ACETAMINOPHEN 5; 325 MG/1; MG/1
1 TABLET ORAL
Qty: 20 | Refills: 0
Start: 2020-11-07 | End: 2020-11-11

## 2020-11-07 RX ADMIN — OXYCODONE HYDROCHLORIDE 10 MILLIGRAM(S): 5 TABLET ORAL at 13:14

## 2020-11-07 RX ADMIN — HEPARIN SODIUM 5000 UNIT(S): 5000 INJECTION INTRAVENOUS; SUBCUTANEOUS at 05:14

## 2020-11-07 RX ADMIN — PIPERACILLIN AND TAZOBACTAM 25 GRAM(S): 4; .5 INJECTION, POWDER, LYOPHILIZED, FOR SOLUTION INTRAVENOUS at 05:16

## 2020-11-07 RX ADMIN — Medication 20 MILLIGRAM(S): at 05:13

## 2020-11-07 RX ADMIN — SPIRONOLACTONE 25 MILLIGRAM(S): 25 TABLET, FILM COATED ORAL at 05:13

## 2020-11-07 RX ADMIN — OXYCODONE HYDROCHLORIDE 10 MILLIGRAM(S): 5 TABLET ORAL at 01:12

## 2020-11-07 RX ADMIN — LISINOPRIL 10 MILLIGRAM(S): 2.5 TABLET ORAL at 05:14

## 2020-11-07 RX ADMIN — OXYCODONE HYDROCHLORIDE 10 MILLIGRAM(S): 5 TABLET ORAL at 12:16

## 2020-11-07 NOTE — DISCHARGE NOTE NURSING/CASE MANAGEMENT/SOCIAL WORK - NSDCFUADDAPPT_GEN_ALL_CORE_FT
Please call the office (#[phone]) to schedule an appointment for follow up with Dr. Mccarthy in 7-10 days.  Follow up with your primary care physician regarding your recent hospitalization.

## 2020-11-07 NOTE — DISCHARGE NOTE NURSING/CASE MANAGEMENT/SOCIAL WORK - PATIENT PORTAL LINK FT
You can access the FollowMyHealth Patient Portal offered by [facility] by registering at the following website: [email or web address]. By joining [employer]’s FollowMyHealth portal, you will also be able to view your health information using other applications (apps) compatible with our system.

## 2020-11-07 NOTE — PROGRESS NOTE ADULT - SUBJECTIVE AND OBJECTIVE BOX
Pt seen and examined at bedside, no acute issues overnight, tolerating cld, no complaints. Abdominal pain adequately controlled with pain meds. Afebrile.    Vital Signs Last 24 Hrs  T(C): 36.6 (07 Nov 2020 04:30), Max: 37.1 (06 Nov 2020 20:30)  T(F): 97.9 (07 Nov 2020 04:30), Max: 98.8 (06 Nov 2020 20:30)  HR: 55 (07 Nov 2020 04:30) (54 - 82)  BP: 112/66 (07 Nov 2020 04:30) (112/66 - 143/71)  BP(mean): --  RR: 12 (07 Nov 2020 04:30) (11 - 24)  SpO2: 97% (07 Nov 2020 04:30) (94% - 99%)  I&O's Summary    05 Nov 2020 07:01  -  06 Nov 2020 07:00  --------------------------------------------------------  IN: 880 mL / OUT: 0 mL / NET: 880 mL    06 Nov 2020 07:01  -  07 Nov 2020 06:52  --------------------------------------------------------  IN: 915 mL / OUT: 425 mL / NET: 490 mL      PHYSICAL EXAM:  GENERAL: NAD  CHEST/LUNG: Clear to ausculation, bilaterally   HEART: RRR S1S2  ABDOMEN: ND, +BS, soft, +incisional tenderness, incisions with Mepilex dressings c/d/i  EXTREMITIES:  calf soft, non tender b/l      LABS:                        13.2   7.98  )-----------( 238      ( 07 Nov 2020 06:37 )             41.2       Assessment: 61 yo male with pmhx CHF s/p AICD with defib,admitted with acute appendicitis, now POD#1 s/p Lap Appy    Plan:  - advance diet as tolerated  - Zosyn  - cont cardio comanagement  - cont home meds  -cont dvt/gi ppx  -oob to walking  -incentive spirometry  -F/U AM labs  -d/c home today

## 2020-11-09 PROBLEM — I50.9 HEART FAILURE, UNSPECIFIED: Chronic | Status: ACTIVE | Noted: 2020-11-03

## 2020-11-12 ENCOUNTER — APPOINTMENT (OUTPATIENT)
Dept: ELECTROPHYSIOLOGY | Facility: CLINIC | Age: 60
End: 2020-11-12
Payer: COMMERCIAL

## 2020-11-12 VITALS — SYSTOLIC BLOOD PRESSURE: 119 MMHG | DIASTOLIC BLOOD PRESSURE: 80 MMHG

## 2020-11-12 PROCEDURE — 93282 PRGRMG EVAL IMPLANTABLE DFB: CPT

## 2020-11-12 PROCEDURE — 99072 ADDL SUPL MATRL&STAF TM PHE: CPT

## 2020-11-16 ENCOUNTER — APPOINTMENT (OUTPATIENT)
Dept: SURGERY | Facility: CLINIC | Age: 60
End: 2020-11-16
Payer: COMMERCIAL

## 2020-11-16 ENCOUNTER — APPOINTMENT (OUTPATIENT)
Dept: ELECTROPHYSIOLOGY | Facility: CLINIC | Age: 60
End: 2020-11-16

## 2020-11-16 VITALS
OXYGEN SATURATION: 99 % | SYSTOLIC BLOOD PRESSURE: 104 MMHG | TEMPERATURE: 98.2 F | BODY MASS INDEX: 28.6 KG/M2 | DIASTOLIC BLOOD PRESSURE: 72 MMHG | HEIGHT: 75 IN | HEART RATE: 73 BPM | WEIGHT: 230 LBS

## 2020-11-16 DIAGNOSIS — Z87.19 PERSONAL HISTORY OF OTHER DISEASES OF THE DIGESTIVE SYSTEM: ICD-10-CM

## 2020-11-16 PROCEDURE — 99024 POSTOP FOLLOW-UP VISIT: CPT

## 2020-11-16 NOTE — HISTORY OF PRESENT ILLNESS
[de-identified] : Pt seen and examined. Pt is s/p lap appendectomy. Path: Appendicitis. Wound edges healing well. Pt was advised to refrain from heavy lifting.

## 2020-11-20 ENCOUNTER — APPOINTMENT (OUTPATIENT)
Dept: CARDIOLOGY | Facility: CLINIC | Age: 60
End: 2020-11-20
Payer: COMMERCIAL

## 2020-11-20 ENCOUNTER — NON-APPOINTMENT (OUTPATIENT)
Age: 60
End: 2020-11-20

## 2020-11-20 VITALS
DIASTOLIC BLOOD PRESSURE: 75 MMHG | SYSTOLIC BLOOD PRESSURE: 127 MMHG | HEART RATE: 71 BPM | OXYGEN SATURATION: 99 % | WEIGHT: 219 LBS | BODY MASS INDEX: 27.23 KG/M2 | TEMPERATURE: 97.4 F | HEIGHT: 75 IN

## 2020-11-20 DIAGNOSIS — Z86.79 PERSONAL HISTORY OF OTHER DISEASES OF THE CIRCULATORY SYSTEM: ICD-10-CM

## 2020-11-20 DIAGNOSIS — K35.80 UNSPECIFIED ACUTE APPENDICITIS: ICD-10-CM

## 2020-11-20 DIAGNOSIS — U07.1 COVID-19: ICD-10-CM

## 2020-11-20 DIAGNOSIS — Z87.891 PERSONAL HISTORY OF NICOTINE DEPENDENCE: ICD-10-CM

## 2020-11-20 DIAGNOSIS — Z83.3 FAMILY HISTORY OF DIABETES MELLITUS: ICD-10-CM

## 2020-11-20 PROCEDURE — 93000 ELECTROCARDIOGRAM COMPLETE: CPT

## 2020-11-20 PROCEDURE — 99214 OFFICE O/P EST MOD 30 MIN: CPT

## 2020-11-21 PROBLEM — U07.1 COVID-19 VIRUS INFECTION: Status: RESOLVED | Noted: 2020-11-21 | Resolved: 2020-11-21

## 2020-11-21 PROBLEM — Z87.891 FORMER SMOKER: Status: ACTIVE | Noted: 2020-11-21

## 2020-11-21 PROBLEM — K35.80 APPENDICITIS, ACUTE: Status: RESOLVED | Noted: 2020-11-21 | Resolved: 2020-11-21

## 2020-11-21 PROBLEM — Z83.3 FAMILY HISTORY OF DIABETES MELLITUS: Status: ACTIVE | Noted: 2020-11-21

## 2020-11-21 PROBLEM — Z86.79 HISTORY OF HYPERTENSION: Status: RESOLVED | Noted: 2020-11-21 | Resolved: 2020-11-21

## 2020-11-21 PROBLEM — Z86.79 HISTORY OF CONGESTIVE HEART FAILURE: Status: RESOLVED | Noted: 2020-11-21 | Resolved: 2020-11-21

## 2020-11-21 PROBLEM — Z86.79 HISTORY OF LEFT BUNDLE BRANCH BLOCK (LBBB): Status: RESOLVED | Noted: 2020-11-21 | Resolved: 2020-11-21

## 2020-11-21 NOTE — DISCUSSION/SUMMARY
[FreeTextEntry1] : In summary, Mr. Genao is a 60 year old gentleman with a history of a nonischemic cardiomyopathy, congestive heart failure, left bundle branch block, and hypertension.  In 2013, Mr. Genao underwent implantation of a Medtronic automated internal cardiac defibrillator (AICD) at [facility] in [place], [place].  He also has a previous history of COVID-19 infection.  Most recently, Mr. Genao was admitted to [facility] with acute appendicitis and underwent a successful laparoscopic appendectomy on November 6th, 2020.\par \par Mr. Genao now presents to the office to establish general cardiovascular care.  He has been able to maintain a good functional capacity and has not had any recent symptoms suggestive of angina or congestive heart failure.  In addition, his blood pressure is under excellent control on his current medical regimen.  I encouraged Mr. Genao to continue to exercise as tolerated.  I also advised him to continue with his current medical regimen.  He will follow up with me in approximately three months, at which time we will obtain a follow up transthoracic echocardiogram.  Consideration will be given to referring Mr. Genao for conversion of his device to a biventricular AICD if his left ventricular systolic function remains decreased in the setting of a left bundle branch block.

## 2020-11-21 NOTE — HISTORY OF PRESENT ILLNESS
[FreeTextEntry1] : Mr. Genao presents to the office today to establish general cardiovascular care.\par \par Mr. Genao is a 60 year old gentleman with a history of a nonischemic cardiomyopathy, congestive heart failure, left bundle branch block, and hypertension.  In 2013, Mr. Genao underwent implantation of a Medtronic automated internal cardiac defibrillator (AICD) at [facility] in [place], [place].\par \par In addition, Mr. Genao reports having been diagnosed with COVID-19 infection in April 2020 and may have had COVID-related pneumonia at that time.  However, he remained at home and did not require hospital admission.  Mr. Genao states that he was treated with outpatient oral antibiotics.\par \par Most recently, Mr. Genao was admitted to [facility] in [place] on November 3rd, 2020 with severe abdominal pain.  He was diagnosed with appendicitis with localized peritonitis.  He was initially treated with bowel rest, IV fluids, and intravenous Zosyn antibiotic.  Mr. Genao underwent a laparoscopic appendectomy on November 6th, 2020.  Mr. Genao recovered quickly and uneventfully from the surgery and was discharged from the hospital on November 7th, 2020.\par \par Of note is that Mr. Genao had a pre-operative transthoracic echocardiogram performed at [facility] on November 3rd, 2020.  In summary, this demonstrated left ventricular dilatation (LV end-diastolic dimension 6.7 cm) with mild left ventricular systolic dysfunction (LVEF 45-50%).  Right ventricular size and systolic function were found to be normal.  Valvular function was found to be normal.\par \par Mr. Genao reports that he has been feeling well since being discharged from the hospital.  He has been able to ambulate without difficulty and denies having any chest pain or dyspnea either at rest or with exertion.  In addition, there has been no history of palpitations, presyncope, or syncope.  Mr. Genao denies having any orthopnea, paroxysmal nocturnal dyspnea, or edema.  Mr. 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 Genao's most recent AICD interrogation was performed remotely on August 17th, 2020.  In summary, this demonstrated that all measured data were within normal limits.  There were no new arrhythmic events for review.

## 2021-02-26 ENCOUNTER — OUTPATIENT (OUTPATIENT)
Dept: OUTPATIENT SERVICES | Facility: HOSPITAL | Age: 61
LOS: 1 days | End: 2021-02-26
Payer: COMMERCIAL

## 2021-02-26 ENCOUNTER — APPOINTMENT (OUTPATIENT)
Dept: CV DIAGNOSITCS | Facility: HOSPITAL | Age: 61
End: 2021-02-26

## 2021-02-26 DIAGNOSIS — I50.22 CHRONIC SYSTOLIC (CONGESTIVE) HEART FAILURE: ICD-10-CM

## 2021-02-26 DIAGNOSIS — Z95.0 PRESENCE OF CARDIAC PACEMAKER: Chronic | ICD-10-CM

## 2021-02-26 PROCEDURE — 93306 TTE W/DOPPLER COMPLETE: CPT | Mod: 26

## 2021-03-24 ENCOUNTER — NON-APPOINTMENT (OUTPATIENT)
Age: 61
End: 2021-03-24

## 2021-03-24 ENCOUNTER — APPOINTMENT (OUTPATIENT)
Dept: ELECTROPHYSIOLOGY | Facility: CLINIC | Age: 61
End: 2021-03-24
Payer: COMMERCIAL

## 2021-03-24 PROCEDURE — 93296 REM INTERROG EVL PM/IDS: CPT

## 2021-03-24 PROCEDURE — 93295 DEV INTERROG REMOTE 1/2/MLT: CPT

## 2021-03-26 ENCOUNTER — NON-APPOINTMENT (OUTPATIENT)
Age: 61
End: 2021-03-26

## 2021-03-26 ENCOUNTER — APPOINTMENT (OUTPATIENT)
Dept: CARDIOLOGY | Facility: CLINIC | Age: 61
End: 2021-03-26
Payer: COMMERCIAL

## 2021-03-26 VITALS
TEMPERATURE: 96.2 F | HEART RATE: 76 BPM | BODY MASS INDEX: 27.23 KG/M2 | RESPIRATION RATE: 16 BRPM | OXYGEN SATURATION: 99 % | HEIGHT: 75 IN | DIASTOLIC BLOOD PRESSURE: 81 MMHG | WEIGHT: 219 LBS | SYSTOLIC BLOOD PRESSURE: 126 MMHG

## 2021-03-26 PROCEDURE — 99214 OFFICE O/P EST MOD 30 MIN: CPT

## 2021-03-26 PROCEDURE — 99072 ADDL SUPL MATRL&STAF TM PHE: CPT

## 2021-03-26 PROCEDURE — 93000 ELECTROCARDIOGRAM COMPLETE: CPT

## 2021-03-27 NOTE — DISCUSSION/SUMMARY
[FreeTextEntry1] : In summary, Mr. Genao appears to be doing well from a cardiac standpoint.  He has been able to maintain a good functional capacity and has not had any recent symptoms suggestive of decompensated heart failure.  Mr. Genao would currently be considered to be in ACC/AHA stage B, [place] Heart Association (NYHA) class I.  Mr. Genao's recent transthoracic echocardiogram demonstrated moderate left ventricular dilatation with severe global LV systolic dysfunction (LVEF 23%) and mild mitral regurgitation.\par \par I suggested to Mr. Genao that we increase his carvedilol dose from 12.5 mg BID to 25 mg BID at this time.  He will otherwise continue with the remainder of his current medical regimen.  I suggested to Mr. Genao that we change his furosemide regimen from 20 mg BID to 40 mg once per day so that he might be able to avoid getting up frequently at night to urinate.  However, Mr. Genao would prefer continuing the BID dosing of the furosemide.  \par \par Given the presence of the left bundle branch block in the setting of severe left ventricular systolic dysfunction, Mr. Genao may be an appropriate candidate for an upgrade of his device to a biventricular AICD.  Mr. Genao will follow up with me in approximately three months.

## 2021-03-27 NOTE — PHYSICAL EXAM
[General Appearance - Well Developed] : well developed [General Appearance - Well Nourished] : well nourished [Normal Conjunctiva] : the conjunctiva exhibited no abnormalities [Eyelids - No Xanthelasma] : the eyelids demonstrated no xanthelasmas [Normal Oral Mucosa] : normal oral mucosa [No Oral Cyanosis] : no oral cyanosis [Normal Jugular Venous A Waves Present] : normal jugular venous A waves present [Normal Jugular Venous V Waves Present] : normal jugular venous V waves present [] : no respiratory distress [Respiration, Rhythm And Depth] : normal respiratory rhythm and effort ,[email or web address] [Auscultation Breath Sounds / Voice Sounds] : lungs were clear to auscultation bilaterally [Heart Sounds] : normal S1 and S2 [Murmurs] : no murmurs present [Edema] : no peripheral edema present [Bowel Sounds] : normal bowel sounds [Abdomen Soft] : soft [Abdomen Tenderness] : non-tender [Abnormal Walk] : normal gait [Nail Clubbing] : no clubbing of the fingernails [Cyanosis, Localized] : no localized cyanosis [Skin Color & Pigmentation] : normal skin color and pigmentation [Oriented To Time, Place, And Person] : oriented to person, place, and time

## 2021-03-27 NOTE — HISTORY OF PRESENT ILLNESS
[FreeTextEntry1] : Mr. Genao presents to the office today for a follow up visit.\par \par Mr. Genao is a 60 year old gentleman with a history of a nonischemic cardiomyopathy, congestive heart failure, left bundle branch block, and hypertension.  In 2013, Mr. Genao underwent implantation of a Medtronic automated internal cardiac defibrillator (AICD) at [facility] in [place], [place].\par \par In addition, Mr. Genao reports having been diagnosed with COVID-19 infection in April 2020 and may have had COVID-related pneumonia at that time.  However, he remained at home and did not require hospital admission.  Mr. Genao states that he was treated with outpatient oral antibiotics.\par \par Most recently, Mr. Genao was admitted to [facility] in [place] on November 3rd, 2020 with severe abdominal pain.  He was diagnosed with appendicitis with localized peritonitis.  He was initially treated with bowel rest, IV fluids, and intravenous Zosyn antibiotic.  Mr. Genao underwent a laparoscopic appendectomy on November 6th, 2020.  Mr. Genao recovered quickly and uneventfully from the surgery and was discharged from the hospital on November 7th, 2020.\par \par Of note is that Mr. Genao had a pre-operative transthoracic echocardiogram performed at [facility] on November 3rd, 2020.  In summary, this demonstrated left ventricular dilatation (LV end-diastolic dimension 6.7 cm) with mild left ventricular systolic dysfunction (LVEF 45-50%).  Right ventricular size and systolic function were found to be normal.  Valvular function was found to be normal.\par \par Mr. Genao's most recent AICD interrogation was performed on November 12th, 2020.  In summary, this demonstrated normal device function with adequate pacing and sensing thresholds noted.  No tachyarrhythmias were noted and no shock therapy had been administered.\par \par Mr. Genao had a follow up transthoracic echocardiogram performed at [facility] on February 26th, 2021.  In summary, this demonstrated moderate left ventricular dilatation (LV end-diastolic dimension 6.6 cm).  There was severe global left ventricular systolic dysfunction (LVEF 23%).  There was mild mitral regurgitation.  Right ventricular size and systolic function were normal.  There was mild diastolic dysfunction.\par \par Mr. Genao reports that he has been feeling well since his last office visit.  He has been able to walk without limitation and denies having any chest pain or dyspnea either at rest or with exertion.  In addition, there has been no history of palpitations, presyncope, or syncope.  Mr. Genao denies having any orthopnea, paroxysmal nocturnal dyspnea, or edema.  Mr. Genao reports that he ordinarily gets up 3-4 times per night to urinate, which is not new for him.  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

## 2021-06-24 ENCOUNTER — NON-APPOINTMENT (OUTPATIENT)
Age: 61
End: 2021-06-24

## 2021-06-24 ENCOUNTER — APPOINTMENT (OUTPATIENT)
Dept: ELECTROPHYSIOLOGY | Facility: CLINIC | Age: 61
End: 2021-06-24
Payer: COMMERCIAL

## 2021-06-24 PROCEDURE — 93296 REM INTERROG EVL PM/IDS: CPT

## 2021-06-24 PROCEDURE — 93295 DEV INTERROG REMOTE 1/2/MLT: CPT

## 2021-07-02 ENCOUNTER — NON-APPOINTMENT (OUTPATIENT)
Age: 61
End: 2021-07-02

## 2021-07-02 ENCOUNTER — APPOINTMENT (OUTPATIENT)
Dept: CARDIOLOGY | Facility: CLINIC | Age: 61
End: 2021-07-02
Payer: COMMERCIAL

## 2021-07-02 VITALS — BODY MASS INDEX: 27.37 KG/M2 | WEIGHT: 219 LBS

## 2021-07-02 VITALS
SYSTOLIC BLOOD PRESSURE: 120 MMHG | HEART RATE: 64 BPM | HEIGHT: 75 IN | DIASTOLIC BLOOD PRESSURE: 78 MMHG | OXYGEN SATURATION: 98 % | BODY MASS INDEX: 27.37 KG/M2

## 2021-07-02 PROCEDURE — 99072 ADDL SUPL MATRL&STAF TM PHE: CPT

## 2021-07-02 PROCEDURE — 99214 OFFICE O/P EST MOD 30 MIN: CPT

## 2021-07-02 PROCEDURE — 93000 ELECTROCARDIOGRAM COMPLETE: CPT

## 2021-07-04 NOTE — HISTORY OF PRESENT ILLNESS
[FreeTextEntry1] : Mr. Genao presents to the office today for a follow up visit.\par \par Mr. Genao is a 60 year old gentleman with a history of a nonischemic cardiomyopathy, congestive heart failure, left bundle branch block, and hypertension.  In 2013, Mr. Genao underwent implantation of a Medtronic automated internal cardiac defibrillator (AICD) at [facility] [place] [facility] in [place], [place].\par \par In addition, Mr. Genao reports having been diagnosed with COVID-19 infection in April 2020 and may have had COVID-related pneumonia at that time.  However, he remained at home and did not require hospital admission.  Mr. Genao states that he was treated with outpatient oral antibiotics.\par \par Most recently, Mr. Genao was admitted to [facility] in [place] on November 3rd, 2020 with severe abdominal pain.  He was diagnosed with appendicitis with localized peritonitis.  He was initially treated with bowel rest, IV fluids, and intravenous Zosyn antibiotic.  Mr. Genao underwent a laparoscopic appendectomy on November 6th, 2020.  Mr. Genao recovered quickly and uneventfully from the surgery and was discharged from the hospital on November 7th, 2020.\par \par Of note is that Mr. Genao had a pre-operative transthoracic echocardiogram performed at [facility] on November 3rd, 2020.  In summary, this demonstrated left ventricular dilatation (LV end-diastolic dimension 6.7 cm) with mild left ventricular systolic dysfunction (LVEF 45-50%).  Right ventricular size and systolic function were found to be normal.  Valvular function was found to be normal.\par \par Mr. Genao's most recent AICD interrogation was performed on November 12th, 2020.  In summary, this demonstrated normal device function with adequate pacing and sensing thresholds noted.  No tachyarrhythmias were noted and no shock therapy had been administered.\par \par Mr. Genao had a follow up transthoracic echocardiogram performed at [facility] on February 26th, 2021.  In summary, this demonstrated moderate left ventricular dilatation (LV end-diastolic dimension 6.6 cm).  There was severe global left ventricular systolic dysfunction (LVEF 23%).  There was mild mitral regurgitation.  Right ventricular size and systolic function were normal.  There was mild diastolic dysfunction.\par \par Mr. Genao reports that he has been feeling well since his last office visit.  He continues to remain active both at home and at work.  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  Mr. Genao denies having any chest pain or dyspnea either at rest or with exertion.  He does get somewhat short of breath when he climbs more than one flight of stairs.  Mr. Genao denies having any palpitations, presyncope, or syncope.  However, he does experience some lightheadedness when he stands up from a sitting or squatting position.  There has been no history of orthopnea, paroxysmal nocturnal dyspnea, or edema.

## 2021-07-04 NOTE — CARDIOLOGY SUMMARY
[de-identified] : 7/2/2021:  Sinus rhythm at 60/minute with rare premature supraventricular complex; left bundle branch block.

## 2021-07-04 NOTE — DISCUSSION/SUMMARY
[FreeTextEntry1] : In summary, Mr. Genao appears to be doing well from a cardiac standpoint.  He continues to maintain a very good functional capacity and has not had any recent symptoms suggestive of angina or congestive heart failure.  In addition, his blood pressure is under excellent control on his current medical regimen.  Mr. Genao would currently be considered to be in ACC/AHA Stage B, [place] Heart Association (NYHA) class I congestive heart failure.\par \par I encouraged Mr. Genao to continue to ambulate as tolerated and advised him to continue with his current medical regimen.  Consideration will be given to upgrading Mr. Genao's device to a biventricular AICD given the presence of a left bundle branch block (LBBB) in the setting of severe left ventricular systolic dysfunction.  Mr. Genao will follow up with me in approximately three months.

## 2021-09-23 ENCOUNTER — APPOINTMENT (OUTPATIENT)
Dept: ELECTROPHYSIOLOGY | Facility: CLINIC | Age: 61
End: 2021-09-23
Payer: COMMERCIAL

## 2021-09-23 ENCOUNTER — NON-APPOINTMENT (OUTPATIENT)
Age: 61
End: 2021-09-23

## 2021-09-23 PROCEDURE — 93296 REM INTERROG EVL PM/IDS: CPT

## 2021-09-23 PROCEDURE — 93295 DEV INTERROG REMOTE 1/2/MLT: CPT

## 2021-10-08 ENCOUNTER — NON-APPOINTMENT (OUTPATIENT)
Age: 61
End: 2021-10-08

## 2021-10-08 ENCOUNTER — APPOINTMENT (OUTPATIENT)
Dept: CARDIOLOGY | Facility: CLINIC | Age: 61
End: 2021-10-08
Payer: COMMERCIAL

## 2021-10-08 VITALS
BODY MASS INDEX: 27.87 KG/M2 | HEIGHT: 75 IN | HEART RATE: 66 BPM | OXYGEN SATURATION: 99 % | SYSTOLIC BLOOD PRESSURE: 112 MMHG | DIASTOLIC BLOOD PRESSURE: 70 MMHG

## 2021-10-08 VITALS — BODY MASS INDEX: 27.87 KG/M2 | WEIGHT: 223 LBS

## 2021-10-08 PROCEDURE — 99214 OFFICE O/P EST MOD 30 MIN: CPT

## 2021-10-08 PROCEDURE — 93000 ELECTROCARDIOGRAM COMPLETE: CPT

## 2021-10-09 NOTE — HISTORY OF PRESENT ILLNESS
[FreeTextEntry1] : Mr. Genao presents to the office today for a follow up visit.\par \par Mr. Genao is a 61 year old gentleman with a history of a nonischemic cardiomyopathy, congestive heart failure, left bundle branch block, and hypertension.  In 2013, Mr. Genao underwent implantation of a Medtronic automated internal cardiac defibrillator (AICD) at [facility] in [place], [place].\par \par In addition, Mr. Genao reports having been diagnosed with COVID-19 infection in April 2020 and may have had COVID-related pneumonia at that time.  However, he remained at home and did not require hospital admission.  Mr. Genao states that he was treated with outpatient oral antibiotics.\par \par Most recently, Mr. Genao was admitted to [facility] in [place] on November 3rd, 2020 with severe abdominal pain.  He was diagnosed with appendicitis with localized peritonitis.  He was initially treated with bowel rest, IV fluids, and intravenous Zosyn antibiotic.  Mr. Genao underwent a laparoscopic appendectomy on November 6th, 2020.  Mr. Genao recovered quickly and uneventfully from the surgery and was discharged from the hospital on November 7th, 2020.\par \par Of note is that Mr. Genao had a pre-operative transthoracic echocardiogram performed at [facility] on November 3rd, 2020.  In summary, this demonstrated left ventricular dilatation (LV end-diastolic dimension 6.7 cm) with mild left ventricular systolic dysfunction (LVEF 45-50%).  Right ventricular size and systolic function were found to be normal.  Valvular function was found to be normal.\par \par Mr. Genao's most recent AICD interrogation was performed on November 12th, 2020.  In summary, this demonstrated normal device function with adequate pacing and sensing thresholds noted.  No tachyarrhythmias were noted and no shock therapy had been administered.\par \par Mr. Genao had a follow up transthoracic echocardiogram performed at [facility] on February 26th, 2021.  In summary, this demonstrated moderate left ventricular dilatation (LV end-diastolic dimension 6.6 cm).  There was severe global left ventricular systolic dysfunction (LVEF 23%).  There was mild mitral regurgitation.  Right ventricular size and systolic function were normal.  There was mild diastolic dysfunction.\par \par Mr. Genao reports that he has been feeling well since his last office visit.  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  In particular, there has been no chest pain or dyspnea either at rest or with exertion.  In addition, Mr. Genao denies having any palpitations, presyncope, or syncope.  There has been no orthopnea, paroxysmal nocturnal dyspnea, or edema.  Mr. Genao has not received any shocks from his defibrillator.  He continues to awaken once per night to urinate.

## 2021-10-09 NOTE — DISCUSSION/SUMMARY
[FreeTextEntry1] : In summary, Mr. Genao continues to do very well from a cardiac standpoint.  He continues to maintain a very good functional capacity and has not had any symptoms suggestive of decompensated heart failure.  In addition, Mr. Genao's blood pressure is under excellent control on his current medical regimen.  Mr. Genao would currently be considered to be in ACC/AHA Stage B, New [place] Heart Association (NYHA) class I congestive heart failure.\par \par I encouraged Mr. Genao to continue to exercise as tolerated.  I also advised him to continue with his current medical regimen.  He will follow up with me in approximately three months, at which time we will obtain a follow up transthoracic echocardiogram.  Depending on the results of the echocardiogram, consideration will be given to upgrading Mr. Genao's AICD to a biventricular device.

## 2021-10-09 NOTE — PHYSICAL EXAM
[Normal Conjunctiva] : normal conjunctiva [No Xanthelasma] : no xanthelasma [Normal Venous Pressure] : normal venous pressure [Soft] : abdomen soft [Non Tender] : non-tender [Normal Bowel Sounds] : normal bowel sounds [No Edema] : no edema [No Cyanosis] : no cyanosis [No Clubbing] : no clubbing [No Rash] : no rash [Normal] : moves all extremities, no focal deficits, normal speech

## 2021-11-15 ENCOUNTER — APPOINTMENT (OUTPATIENT)
Dept: ELECTROPHYSIOLOGY | Facility: CLINIC | Age: 61
End: 2021-11-15
Payer: COMMERCIAL

## 2021-11-15 VITALS — DIASTOLIC BLOOD PRESSURE: 71 MMHG | RESPIRATION RATE: 14 BRPM | SYSTOLIC BLOOD PRESSURE: 122 MMHG | HEART RATE: 60 BPM

## 2021-11-15 PROCEDURE — 93282 PRGRMG EVAL IMPLANTABLE DFB: CPT

## 2021-11-15 PROCEDURE — 93290 INTERROG DEV EVAL ICPMS IP: CPT | Mod: 26

## 2022-02-11 ENCOUNTER — APPOINTMENT (OUTPATIENT)
Dept: CARDIOLOGY | Facility: CLINIC | Age: 62
End: 2022-02-11
Payer: COMMERCIAL

## 2022-02-11 ENCOUNTER — NON-APPOINTMENT (OUTPATIENT)
Age: 62
End: 2022-02-11

## 2022-02-11 VITALS
DIASTOLIC BLOOD PRESSURE: 69 MMHG | WEIGHT: 225 LBS | HEART RATE: 62 BPM | TEMPERATURE: 97.5 F | BODY MASS INDEX: 27.98 KG/M2 | HEIGHT: 75 IN | SYSTOLIC BLOOD PRESSURE: 120 MMHG | OXYGEN SATURATION: 99 %

## 2022-02-11 PROCEDURE — 99214 OFFICE O/P EST MOD 30 MIN: CPT

## 2022-02-11 PROCEDURE — 93000 ELECTROCARDIOGRAM COMPLETE: CPT

## 2022-02-12 NOTE — HISTORY OF PRESENT ILLNESS
[FreeTextEntry1] : Mr. Genao presents to the office today for a follow up visit.\par \par Mr. Genao is a 61 year old gentleman with a history of a nonischemic cardiomyopathy, congestive heart failure, left bundle branch block, and hypertension.  In 2013, Mr. Genao underwent implantation of a Medtronic automated internal cardiac defibrillator (AICD) at [facility] in [place], [place].\par \par In addition, Mr. Genao reports having been diagnosed with COVID-19 infection in April 2020 and may have had COVID-related pneumonia at that time.  However, he remained at home and did not require hospital admission.  Mr. Genao states that he was treated with outpatient oral antibiotics.\par \par Most recently, Mr. Genao was admitted to [facility] in [place] on November 3rd, 2020 with severe abdominal pain.  He was diagnosed with appendicitis with localized peritonitis.  He was initially treated with bowel rest, IV fluids, and intravenous Zosyn antibiotic.  Mr. Genao underwent a laparoscopic appendectomy on November 6th, 2020.  Mr. Genao recovered quickly and uneventfully from the surgery and was discharged from the hospital on November 7th, 2020.\par \par Of note is that Mr. Genao had a pre-operative transthoracic echocardiogram performed at [facility] on November 3rd, 2020.  In summary, this demonstrated left ventricular dilatation (LV end-diastolic dimension 6.7 cm) with mild left ventricular systolic dysfunction (LVEF 45-50%).  Right ventricular size and systolic function were found to be normal.  Valvular function was found to be normal.\par \par Mr. Genao's most recent AICD interrogation was performed remotely on September 23rd, 2021.  In summary, all measured data were within normal limits.  There were no new arrhythmic episodes for review.\par \par Mr. Genao's most recent transthoracic echocardiogram was performed at [facility] on February 26th, 2021.  In summary, this demonstrated moderate left ventricular dilatation (LV end-diastolic dimension 6.6 cm).  There was severe global left ventricular systolic dysfunction (LVEF 23%).  There was mild mitral regurgitation.  Right ventricular size and systolic function were normal.  There was mild diastolic dysfunction.\par \par Mr. Genao reports that he has been feeling well since his last office visit.  He has generally been able to ambulate without difficulty and has not had any dyspnea with walking on a level surface.  Mr. Genao does experience some dyspnea with climbing stairs.  There has been no chest pain either at rest or with exertion.  Mr. Genao denies having any palpitations, presyncope, or syncope.  There has been no orthopnea, paroxysmal nocturnal dyspnea, or edema.  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  Of note is that the plan was for Mr. Genao to have a follow up transthoracic echocardiogram performed at the time of today's office visit.  However, he forgot to schedule the study.

## 2022-02-12 NOTE — CARDIOLOGY SUMMARY
[de-identified] : 2/11/2022:  Sinus rhythm at 62/minute with premature supraventricular complexes; left bundle branch block.

## 2022-02-12 NOTE — DISCUSSION/SUMMARY
[FreeTextEntry1] : In summary, Mr. Genao continues to do well from a cardiac standpoint.  He continues to maintain a very good functional capacity and has not had any symptoms suggestive of angina or congestive heart failure.  In addition, Mr. Genao's blood pressure is under excellent control on his current medical regimen.  Mr. Genao would currently be considered to be in ACC/AHA Stage B, New [place] Heart Association (NYHA) class I congestive heart failure.\par \par I encouraged Mr. Genao to continue to exercise as tolerated.  I also advised him to continue with his current medical regimen.  He will follow up with me in approximately three months, at which time we will obtain a follow up transthoracic echocardiogram.  Depending on the results of the echocardiogram, consideration will be given to upgrading Mr. Genao's device to a biventricular AICD.

## 2022-02-16 ENCOUNTER — APPOINTMENT (OUTPATIENT)
Dept: ELECTROPHYSIOLOGY | Facility: CLINIC | Age: 62
End: 2022-02-16
Payer: COMMERCIAL

## 2022-02-16 ENCOUNTER — NON-APPOINTMENT (OUTPATIENT)
Age: 62
End: 2022-02-16

## 2022-02-16 PROCEDURE — 93296 REM INTERROG EVL PM/IDS: CPT

## 2022-02-16 PROCEDURE — 93295 DEV INTERROG REMOTE 1/2/MLT: CPT

## 2022-05-18 ENCOUNTER — NON-APPOINTMENT (OUTPATIENT)
Age: 62
End: 2022-05-18

## 2022-05-18 ENCOUNTER — APPOINTMENT (OUTPATIENT)
Dept: ELECTROPHYSIOLOGY | Facility: CLINIC | Age: 62
End: 2022-05-18
Payer: COMMERCIAL

## 2022-05-18 PROCEDURE — 93295 DEV INTERROG REMOTE 1/2/MLT: CPT

## 2022-05-18 PROCEDURE — 93296 REM INTERROG EVL PM/IDS: CPT

## 2022-08-12 ENCOUNTER — APPOINTMENT (OUTPATIENT)
Dept: CARDIOLOGY | Facility: CLINIC | Age: 62
End: 2022-08-12

## 2022-08-12 ENCOUNTER — NON-APPOINTMENT (OUTPATIENT)
Age: 62
End: 2022-08-12

## 2022-08-12 VITALS
HEART RATE: 63 BPM | HEIGHT: 75 IN | DIASTOLIC BLOOD PRESSURE: 70 MMHG | OXYGEN SATURATION: 98 % | WEIGHT: 218 LBS | SYSTOLIC BLOOD PRESSURE: 125 MMHG | BODY MASS INDEX: 27.1 KG/M2

## 2022-08-12 PROCEDURE — 99214 OFFICE O/P EST MOD 30 MIN: CPT

## 2022-08-12 PROCEDURE — 93000 ELECTROCARDIOGRAM COMPLETE: CPT

## 2022-08-17 ENCOUNTER — NON-APPOINTMENT (OUTPATIENT)
Age: 62
End: 2022-08-17

## 2022-08-17 ENCOUNTER — APPOINTMENT (OUTPATIENT)
Dept: ELECTROPHYSIOLOGY | Facility: CLINIC | Age: 62
End: 2022-08-17

## 2022-08-17 PROCEDURE — 93295 DEV INTERROG REMOTE 1/2/MLT: CPT

## 2022-08-17 PROCEDURE — 93296 REM INTERROG EVL PM/IDS: CPT

## 2022-08-18 NOTE — HISTORY OF PRESENT ILLNESS
[FreeTextEntry1] : Mr. Genao presents to the office today for a follow up visit.\par \par Mr. Genao is a 62 year old gentleman with a history of a nonischemic cardiomyopathy, congestive heart failure, left bundle branch block, and hypertension.  In 2013, Mr. Genao underwent implantation of a Medtronic automated internal cardiac defibrillator (AICD) at [facility] in [place], [place].\par \par In addition, Mr. Genao reports having been diagnosed with COVID-19 infection in April 2020 and may have had COVID-related pneumonia at that time.  However, he remained at home and did not require hospital admission.  Mr. Genao states that he was treated with outpatient oral antibiotics.\par \par Most recently, Mr. Genao was admitted to [facility] in [place] on November 3rd, 2020 with severe abdominal pain.  He was diagnosed with appendicitis with localized peritonitis.  He was initially treated with bowel rest, IV fluids, and intravenous Zosyn antibiotic.  Mr. Genao underwent a laparoscopic appendectomy on November 6th, 2020.  Mr. Genao recovered quickly and uneventfully from the surgery and was discharged from the hospital on November 7th, 2020.\par \par Of note is that Mr. Genao had a pre-operative transthoracic echocardiogram performed at [facility] on November 3rd, 2020.  In summary, this demonstrated left ventricular dilatation (LV end-diastolic dimension 6.7 cm) with mild left ventricular systolic dysfunction (LVEF 45-50%).  Right ventricular size and systolic function were found to be normal.  Valvular function was found to be normal.\par \par Mr. Genao's most recent AICD interrogation was performed remotely on May 18th, 2022.  In summary, all measured data were within normal limits.  There were no new arrhythmic episodes for review.  It was estimated that 3.8 months remain until the device reaches the elective replacement indicator (JANEY).\par \par Mr. Genao's most recent transthoracic echocardiogram was performed at [facility] on February 26th, 2021.  In summary, this demonstrated moderate left ventricular dilatation (LV end-diastolic dimension 6.6 cm).  There was severe global left ventricular systolic dysfunction (LVEF 23%).  There was mild mitral regurgitation.  Right ventricular size and systolic function were normal.  There was mild diastolic dysfunction.\par \par Mr. Genao reports that he has been feeling well since his last office visit.  He was diagnosed with recurrent COVID-19 in July 2022.  He was apparently treated with Paxlovid and reports that he had a mild illness that did not require hospital admission.  Mr. Genao has been able to ambulate without difficulty and denies having any chest pain or dyspnea either at rest or with exertion.  In addition, there has been no palpitations, presyncope, or syncope.  Mr. Genao denies having any orthopnea, paroxysmal nocturnal dyspnea, or edema.  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  Of note is that the plan was for Mr. Genao to have a follow up transthoracic echocardiogram performed at the time of today's office visit.  However, he forgot to schedule the study.

## 2022-08-18 NOTE — DISCUSSION/SUMMARY
[EKG obtained to assist in diagnosis and management of assessed problem(s)] : EKG obtained to assist in diagnosis and management of assessed problem(s) [FreeTextEntry1] : In summary, Mr. Genao continues to do very well from a cardiac standpoint.  He continues to maintain a very good functional capacity and has not had any symptoms suggestive of angina or congestive heart failure.  In addition, Mr. Genao's blood pressure is under excellent control on his current medical regimen.  Mr. Genao would currently be considered to be in ACC/AHA Stage B, New [place] Heart Association (NYHA) class I congestive heart failure.\par \par I encouraged Mr. Genao to continue to exercise as tolerated and advised him to continue with his current medical regimen.  He will follow up with me in approximately three months, at which time we will obtain a follow up transthoracic echocardiogram.  Depending on the results of the echocardiogram, consideration will be given to upgrading Mr. Genao's AICD to a biventricular AICD/pacemaker.

## 2022-11-04 ENCOUNTER — NON-APPOINTMENT (OUTPATIENT)
Age: 62
End: 2022-11-04

## 2022-11-04 ENCOUNTER — APPOINTMENT (OUTPATIENT)
Dept: CARDIOLOGY | Facility: CLINIC | Age: 62
End: 2022-11-04

## 2022-11-04 VITALS
WEIGHT: 216 LBS | HEART RATE: 65 BPM | SYSTOLIC BLOOD PRESSURE: 149 MMHG | BODY MASS INDEX: 26.86 KG/M2 | DIASTOLIC BLOOD PRESSURE: 78 MMHG | OXYGEN SATURATION: 99 % | HEIGHT: 75 IN

## 2022-11-04 PROCEDURE — 99214 OFFICE O/P EST MOD 30 MIN: CPT

## 2022-11-04 PROCEDURE — 93000 ELECTROCARDIOGRAM COMPLETE: CPT

## 2022-11-06 NOTE — HISTORY OF PRESENT ILLNESS
[FreeTextEntry1] : Mr. Genao presents to the office today for a follow up visit.\par \par Mr. Genao is a 62 year old gentleman with a history of a nonischemic cardiomyopathy, congestive heart failure, left bundle branch block (LBBB), and hypertension.  In 2013, Mr. Genao underwent implantation of a Medtronic automated internal cardiac defibrillator (AICD) at [facility] in [place], [place].\par \par In addition, Mr. Genao reports having been diagnosed with COVID-19 infection in April 2020 and may have had COVID-related pneumonia at that time.  However, he remained at home and did not require hospital admission.  Mr. Genao states that he was treated with outpatient oral antibiotics.\par \par Most recently, Mr. Genao was admitted to [facility] in [place] on November 3rd, 2020 with severe abdominal pain.  He was diagnosed with appendicitis with localized peritonitis.  He was initially treated with bowel rest, IV fluids, and intravenous Zosyn antibiotic.  Mr. Genao underwent a laparoscopic appendectomy on November 6th, 2020.  Mr. Genao recovered quickly and uneventfully from the surgery and was discharged from the hospital on November 7th, 2020.\par \par Of note is that Mr. Genao had a pre-operative transthoracic echocardiogram performed at [facility] on November 3rd, 2020.  In summary, this demonstrated left ventricular dilatation (LV end-diastolic dimension 6.7 cm) with mild left ventricular systolic dysfunction (LVEF 45-50%).  Right ventricular size and systolic function were found to be normal.  Valvular function was found to be normal.\par \par Mr. Genao's most recent transthoracic echocardiogram was performed at [facility] on February 26th, 2021.  In summary, this demonstrated moderate left ventricular dilatation (LV end-diastolic dimension 6.6 cm).  There was severe global left ventricular systolic dysfunction (LVEF 23%).  There was mild mitral regurgitation.  Right ventricular size and systolic function were normal.  There was mild diastolic dysfunction.\par \par Mr. Genao's most recent AICD interrogation was performed remotely on August 17th, 2022.  In summary, this demonstrated that all measured data were within normal limits.  There were no arrhythmic events for review.  The device battery voltage was estimated at 2.64V (recommended replacement at 2.63V).\par \par Mr. Genao reports that he has been feeling well since his last office visit.  He continues to walk without difficulty or limitation and denies having any chest pain or dyspnea either at rest or with exertion.  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  Mr. Genao denies having any palpitations, presyncope, or syncope.  In addition, there has been no orthopnea, paroxysmal nocturnal dyspnea, or edema.  Of note is that the plan was for Mr. Genao to have a follow up transthoracic echocardiogram performed at the time of today's office visit.  However, he forgot to schedule the study.  Of note, also, is that Mr. Genao will be departing for a six day to trip to [place] on November 6th, 2022.

## 2022-11-06 NOTE — DISCUSSION/SUMMARY
[EKG obtained to assist in diagnosis and management of assessed problem(s)] : EKG obtained to assist in diagnosis and management of assessed problem(s) [FreeTextEntry1] : In summary, Mr. Genao appears to be doing well from a cardiac standpoint.  He continues to maintain a good functional capacity and has not had any symptoms suggestive of angina or congestive heart failure.  Mr. Genao's blood pressure was mildly elevated at today's office visit.  However, as his blood pressure is ordinarily under good control, no changes were made to Mr. Genao's antihypertensive regimen at this time.\par \par Mr. Genao would currently be considered to be in ACC/AHA Stage B, [place] Heart Association class I congestive heart failure.  I encouraged Mr. Gneao to continue to exercise as tolerated and advised him to continue with his current medical regimen.  He will follow up with me in approximately three months, at which time we will obtain a follow up transthoracic echocardiogram.  Depending on the results of the echocardiogram, consideration will be given to upgrading Mr. Genao's AICD to a biventricular pacemaker/AICD.

## 2022-11-16 ENCOUNTER — APPOINTMENT (OUTPATIENT)
Dept: ELECTROPHYSIOLOGY | Facility: CLINIC | Age: 62
End: 2022-11-16

## 2022-11-16 ENCOUNTER — NON-APPOINTMENT (OUTPATIENT)
Age: 62
End: 2022-11-16

## 2022-11-16 PROCEDURE — 93295 DEV INTERROG REMOTE 1/2/MLT: CPT

## 2022-11-16 PROCEDURE — 93296 REM INTERROG EVL PM/IDS: CPT

## 2022-11-18 ENCOUNTER — APPOINTMENT (OUTPATIENT)
Dept: ELECTROPHYSIOLOGY | Facility: CLINIC | Age: 62
End: 2022-11-18

## 2022-12-05 ENCOUNTER — RX RENEWAL (OUTPATIENT)
Age: 62
End: 2022-12-05

## 2022-12-19 ENCOUNTER — NON-APPOINTMENT (OUTPATIENT)
Age: 62
End: 2022-12-19

## 2022-12-19 ENCOUNTER — APPOINTMENT (OUTPATIENT)
Dept: ELECTROPHYSIOLOGY | Facility: CLINIC | Age: 62
End: 2022-12-19
Payer: COMMERCIAL

## 2022-12-19 VITALS
HEIGHT: 75 IN | HEART RATE: 67 BPM | DIASTOLIC BLOOD PRESSURE: 70 MMHG | BODY MASS INDEX: 27.23 KG/M2 | SYSTOLIC BLOOD PRESSURE: 119 MMHG | OXYGEN SATURATION: 99 % | WEIGHT: 219 LBS

## 2022-12-19 VITALS — RESPIRATION RATE: 14 BRPM | DIASTOLIC BLOOD PRESSURE: 78 MMHG | HEART RATE: 68 BPM | SYSTOLIC BLOOD PRESSURE: 123 MMHG

## 2022-12-19 PROCEDURE — 99215 OFFICE O/P EST HI 40 MIN: CPT

## 2022-12-19 PROCEDURE — 93290 INTERROG DEV EVAL ICPMS IP: CPT | Mod: 26

## 2022-12-19 PROCEDURE — 93282 PRGRMG EVAL IMPLANTABLE DFB: CPT

## 2022-12-19 PROCEDURE — 93000 ELECTROCARDIOGRAM COMPLETE: CPT | Mod: 59

## 2022-12-19 PROCEDURE — 99215 OFFICE O/P EST HI 40 MIN: CPT | Mod: 25

## 2022-12-19 RX ORDER — NIRMATRELVIR AND RITONAVIR 300-100 MG
20 X 150 MG & KIT ORAL
Qty: 30 | Refills: 0 | Status: COMPLETED | COMMUNITY
Start: 2022-07-15

## 2022-12-19 RX ORDER — COVID-19 ANTIGEN TEST
KIT MISCELLANEOUS
Qty: 8 | Refills: 0 | Status: ACTIVE | COMMUNITY
Start: 2022-07-29

## 2022-12-19 NOTE — DISCUSSION/SUMMARY
[EKG obtained to assist in diagnosis and management of assessed problem(s)] : EKG obtained to assist in diagnosis and management of assessed problem(s) [FreeTextEntry1] : Impression:\par \par 1. NICM/chronic systolic CHF: s/p ICD. ICD checked today and nearing JANEY. No VT or ICD shocks. Resume OMT as prescribed, encouraged heart healthy diet, daily weight, and regular f/u with Cardiology/Heart failure team as scheduled.\par \par 2. LBBB: EKG today NSR with evidence of LBBB,  msec. Discussed possibility of upgrade to BiV device given severe LV dysfunction on ECHO despite OMT. We discussed the procedures, risks and outcomes of CRT-D and living with an ICD. We discussed management of CRT-D therapy throughout life, including deactivation of the ICD. After all questions were answered, and literature was provided, it was a shared decision to proceed with CRT-D therapy.\par \par 3. HTN: resume oral antihypertensives as prescribed. Encouraged heart healthy diet, sodium restriction, and weight loss. Continue regular f/u with Cardiologist for further HTN management.\par \par Plan for upgrade to BiV ICD.

## 2022-12-19 NOTE — HISTORY OF PRESENT ILLNESS
[FreeTextEntry1] : Puma Genao is a 63y/o man with Hx of HTN, LBBB, non infarct related cardiomyopathy, and chronic systolic CHF, LVEF 25%, s/p ICD placement (2013) who presents today for routine f/u as ICD is nearing JANEY. Admits doing well with no issues or complaints. Denies chest pain, palpitations, SOB, syncope or near syncope. No ICD shocks.

## 2022-12-19 NOTE — CARDIOLOGY SUMMARY
[de-identified] : 2/26/2021: CONCLUSIONS:\par 1. Mitral annular calcification, otherwise normal mitral\par valve. Mild mitral regurgitation. \par 2. Moderate left ventricular enlargement.\par 3. Severe global left ventricular systolic dysfunction.\par 4. Mild diastolic dysfunction (Stage I).\par 5. Normal right ventricular size and function.  A device\par wire is noted in the right heart.\par *** No previous Echo exam.

## 2022-12-21 NOTE — ED ADULT NURSE NOTE - NSFALLRSKUNASSIST_ED_ALL_ED
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 Patient reports she has been ill since Thanksgiving.  She was treated at Urgent Care x 2 without improvement in cough, wheeze, shortness of breath or productive cough.  She was prescribed medication Trelegy however has not taken this as she was  unable to pick it up for four days.  She reports she feels "terrible." She is here for follow up. 
no

## 2023-01-10 ENCOUNTER — OUTPATIENT (OUTPATIENT)
Dept: OUTPATIENT SERVICES | Facility: HOSPITAL | Age: 63
LOS: 1 days | End: 2023-01-10

## 2023-01-10 VITALS
SYSTOLIC BLOOD PRESSURE: 123 MMHG | HEART RATE: 63 BPM | OXYGEN SATURATION: 100 % | WEIGHT: 222.01 LBS | RESPIRATION RATE: 18 BRPM | DIASTOLIC BLOOD PRESSURE: 78 MMHG | TEMPERATURE: 97 F | HEIGHT: 74 IN

## 2023-01-10 DIAGNOSIS — I50.22 CHRONIC SYSTOLIC (CONGESTIVE) HEART FAILURE: ICD-10-CM

## 2023-01-10 DIAGNOSIS — Z95.0 PRESENCE OF CARDIAC PACEMAKER: Chronic | ICD-10-CM

## 2023-01-10 DIAGNOSIS — Z95.810 PRESENCE OF AUTOMATIC (IMPLANTABLE) CARDIAC DEFIBRILLATOR: Chronic | ICD-10-CM

## 2023-01-10 DIAGNOSIS — Z90.49 ACQUIRED ABSENCE OF OTHER SPECIFIED PARTS OF DIGESTIVE TRACT: Chronic | ICD-10-CM

## 2023-01-10 DIAGNOSIS — Z01.812 ENCOUNTER FOR PREPROCEDURAL LABORATORY EXAMINATION: ICD-10-CM

## 2023-01-10 LAB
ALBUMIN SERPL ELPH-MCNC: 4.6 G/DL — SIGNIFICANT CHANGE UP (ref 3.3–5)
ALP SERPL-CCNC: 41 U/L — SIGNIFICANT CHANGE UP (ref 40–120)
ALT FLD-CCNC: 16 U/L — SIGNIFICANT CHANGE UP (ref 4–41)
ANION GAP SERPL CALC-SCNC: 10 MMOL/L — SIGNIFICANT CHANGE UP (ref 7–14)
AST SERPL-CCNC: 19 U/L — SIGNIFICANT CHANGE UP (ref 4–40)
BILIRUB SERPL-MCNC: 0.2 MG/DL — SIGNIFICANT CHANGE UP (ref 0.2–1.2)
BLD GP AB SCN SERPL QL: NEGATIVE — SIGNIFICANT CHANGE UP
BUN SERPL-MCNC: 15 MG/DL — SIGNIFICANT CHANGE UP (ref 7–23)
CALCIUM SERPL-MCNC: 8.9 MG/DL — SIGNIFICANT CHANGE UP (ref 8.4–10.5)
CHLORIDE SERPL-SCNC: 102 MMOL/L — SIGNIFICANT CHANGE UP (ref 98–107)
CO2 SERPL-SCNC: 25 MMOL/L — SIGNIFICANT CHANGE UP (ref 22–31)
CREAT SERPL-MCNC: 1.02 MG/DL — SIGNIFICANT CHANGE UP (ref 0.5–1.3)
EGFR: 82 ML/MIN/1.73M2 — SIGNIFICANT CHANGE UP
GLUCOSE SERPL-MCNC: 87 MG/DL — SIGNIFICANT CHANGE UP (ref 70–99)
HCT VFR BLD CALC: 42.1 % — SIGNIFICANT CHANGE UP (ref 39–50)
HGB BLD-MCNC: 13.4 G/DL — SIGNIFICANT CHANGE UP (ref 13–17)
MCHC RBC-ENTMCNC: 26.3 PG — LOW (ref 27–34)
MCHC RBC-ENTMCNC: 31.8 GM/DL — LOW (ref 32–36)
MCV RBC AUTO: 82.5 FL — SIGNIFICANT CHANGE UP (ref 80–100)
NRBC # BLD: 0 /100 WBCS — SIGNIFICANT CHANGE UP (ref 0–0)
NRBC # FLD: 0 K/UL — SIGNIFICANT CHANGE UP (ref 0–0)
PLATELET # BLD AUTO: 202 K/UL — SIGNIFICANT CHANGE UP (ref 150–400)
POTASSIUM SERPL-MCNC: 4.4 MMOL/L — SIGNIFICANT CHANGE UP (ref 3.5–5.3)
POTASSIUM SERPL-SCNC: 4.4 MMOL/L — SIGNIFICANT CHANGE UP (ref 3.5–5.3)
PROT SERPL-MCNC: 8.2 G/DL — SIGNIFICANT CHANGE UP (ref 6–8.3)
RBC # BLD: 5.1 M/UL — SIGNIFICANT CHANGE UP (ref 4.2–5.8)
RBC # FLD: 15 % — HIGH (ref 10.3–14.5)
RH IG SCN BLD-IMP: POSITIVE — SIGNIFICANT CHANGE UP
SODIUM SERPL-SCNC: 137 MMOL/L — SIGNIFICANT CHANGE UP (ref 135–145)
WBC # BLD: 6.94 K/UL — SIGNIFICANT CHANGE UP (ref 3.8–10.5)
WBC # FLD AUTO: 6.94 K/UL — SIGNIFICANT CHANGE UP (ref 3.8–10.5)

## 2023-01-10 RX ORDER — CARVEDILOL PHOSPHATE 80 MG/1
1 CAPSULE, EXTENDED RELEASE ORAL
Qty: 0 | Refills: 0 | DISCHARGE

## 2023-01-10 RX ORDER — CARVEDILOL PHOSPHATE 80 MG/1
12.5 CAPSULE, EXTENDED RELEASE ORAL
Qty: 0 | Refills: 0 | DISCHARGE

## 2023-01-10 RX ORDER — LISINOPRIL 2.5 MG/1
10 TABLET ORAL
Qty: 0 | Refills: 0 | DISCHARGE

## 2023-01-10 RX ORDER — FUROSEMIDE 40 MG
20 TABLET ORAL
Qty: 0 | Refills: 0 | DISCHARGE

## 2023-01-10 NOTE — H&P PST ADULT - NSICDXPASTMEDICALHX_GEN_ALL_CORE_FT
PAST MEDICAL HISTORY:  Congestive heart failure, unspecified HF chronicity, unspecified heart failure type      PAST MEDICAL HISTORY:  Congestive heart failure, unspecified HF chronicity, unspecified heart failure type     Hypertension

## 2023-01-10 NOTE — H&P PST ADULT - PROBLEM SELECTOR PLAN 1
Scheduled for MDT upgrade to BI-V ICD on 1/20/2023   Written & verbal preop instructions, gi prophylaxis & surgical soap given  Pt verbalized good understanding.  Teach back done on surgical soap instructions. Scheduled for MDT upgrade to BI-V ICD on 1/20/2023   Written & verbal preop instructions given - Pt instructed to follow Preop instructions from Dr Grover office and to contact office with any questions.    Pt verbalized good understanding.  Teach back done on surgical soap instructions. Scheduled for MDT upgrade to BI-V ICD on 1/20/2023   Written & verbal preop instructions given - Pt instructed to follow Preop instructions from Dr Grover office and to contact office with any questions.    Pt verbalized good understanding.  Teach back done on surgical soap instructions.  MIRIAM precautions recommended.

## 2023-01-10 NOTE — H&P PST ADULT - HISTORY OF PRESENT ILLNESS
61y/o male present for preop eval for scheduled MDT upgrade to BI-V ICD.  Pt with h/o HTN, LBBB, cardiomyopathy and chronic systolic CHF, LVEF 25%.  H/o ICD inserted 2013.  Pt states "defibrillator battery is close to end of life".  Pt denies chest pain, palpitation,  c/o SOB with walking 1-2 city blocks at times.

## 2023-01-10 NOTE — H&P PST ADULT - NSICDXPASTSURGICALHX_GEN_ALL_CORE_FT
PAST SURGICAL HISTORY:  AICD (automatic cardioverter/defibrillator) present     History of appendectomy

## 2023-01-20 ENCOUNTER — OUTPATIENT (OUTPATIENT)
Dept: OUTPATIENT SERVICES | Facility: HOSPITAL | Age: 63
LOS: 1 days | Discharge: HOME | End: 2023-01-20
Payer: COMMERCIAL

## 2023-01-20 DIAGNOSIS — Z90.49 ACQUIRED ABSENCE OF OTHER SPECIFIED PARTS OF DIGESTIVE TRACT: Chronic | ICD-10-CM

## 2023-01-20 DIAGNOSIS — Z95.810 PRESENCE OF AUTOMATIC (IMPLANTABLE) CARDIAC DEFIBRILLATOR: Chronic | ICD-10-CM

## 2023-01-20 DIAGNOSIS — I50.22 CHRONIC SYSTOLIC (CONGESTIVE) HEART FAILURE: ICD-10-CM

## 2023-01-20 PROCEDURE — 71045 X-RAY EXAM CHEST 1 VIEW: CPT | Mod: 26

## 2023-01-20 PROCEDURE — 33249 INSJ/RPLCMT DEFIB W/LEAD(S): CPT

## 2023-01-20 PROCEDURE — 93010 ELECTROCARDIOGRAM REPORT: CPT | Mod: 76

## 2023-01-20 PROCEDURE — 33225 L VENTRIC PACING LEAD ADD-ON: CPT

## 2023-01-20 RX ORDER — CARVEDILOL PHOSPHATE 80 MG/1
1 CAPSULE, EXTENDED RELEASE ORAL
Qty: 0 | Refills: 0 | DISCHARGE

## 2023-01-20 RX ORDER — LISINOPRIL 2.5 MG/1
10 TABLET ORAL
Qty: 0 | Refills: 0 | DISCHARGE

## 2023-01-20 RX ORDER — FUROSEMIDE 40 MG
0 TABLET ORAL
Qty: 0 | Refills: 0 | DISCHARGE

## 2023-01-20 RX ORDER — SPIRONOLACTONE 25 MG/1
25 TABLET, FILM COATED ORAL
Qty: 0 | Refills: 0 | DISCHARGE

## 2023-01-20 RX ORDER — SPIRONOLACTONE 25 MG/1
1 TABLET, FILM COATED ORAL
Qty: 0 | Refills: 0 | DISCHARGE

## 2023-01-20 RX ORDER — SODIUM CHLORIDE 9 MG/ML
3 INJECTION INTRAMUSCULAR; INTRAVENOUS; SUBCUTANEOUS EVERY 8 HOURS
Refills: 0 | Status: DISCONTINUED | OUTPATIENT
Start: 2023-01-20 | End: 2023-02-03

## 2023-01-20 RX ORDER — FUROSEMIDE 40 MG
1 TABLET ORAL
Qty: 0 | Refills: 0 | DISCHARGE

## 2023-01-20 RX ORDER — LISINOPRIL 2.5 MG/1
1 TABLET ORAL
Qty: 0 | Refills: 0 | DISCHARGE

## 2023-01-20 NOTE — CHART NOTE - NSCHARTNOTEFT_GEN_A_CORE
In brief this is a 61 y/o M with PMH of HTN, LBBB, NICM(with EF of 25% s/p AICD placement in 2013) presented to [facility] for upgrade to BIV AICD. Patient stated that he still has intermittent VIEIRA when he exerts himself a good distance. Patient otherwise denied any other complaints such as CP, lightheadedness or dizziness. Patient denied any episodes of syncope or ICD shocks. Patient had his AICD interrogated and noted that it was nearing JANEY. Patient still with low EF regardless of OMT. Reviewed medication list with patient and updated on patient's chart. Explained about the procedure in detail to the patient and wife at bedside and all risks/benefits explained and the patient understood and signed the consents. Reviewed all scripts note and pre-surgical testing H&P.     EKG: Sinus bradycardia with PVC at a rate of 58 with QTc of 510
This is a 61 y/o M with PMH of HTN, LBBB, NICM(with EF of 25% s/p ICD placement in 2013) presented to [facility] for upgrade to BIV ICD s/p Medtronic BiV ICD on 1/20/2023    - Post-op Medtronic BiV ICD instruction has been verbally explained and given to the patient. Patient was also given ___home monitor, booklet and ID card. Patient expressed understanding and all questions were answered. A copy of the instruction is located in the patients chart   - Patient is schedule for an appointment on2/2/2023 AT 2:00PM at 4th floor Oncology Building [phone]   - No scrubbing the incision site for 2 weeks  - No lotion, ointment, powder or direct sunlight to the incision site for 2 weeks   - No lifting more than 5lb or exertional exercising such as jogging, running, bike riding for 6-8 weeks  - Do not get the surgical incision wet for 1 week  - No swimming pool, Jacuzzi, or bath for 6-8 weeks.   - You should carry your ID card for metal detectors   - Remove bandage after 24-48hours  - Patient can shower 24 hours after procedure. Pat the area dry  - Keep Cellular phone 6 in away  from the device  - Pt was instructed to call [phone] if the following occurs:      - fever with temperature > 100.6      - swelling, drainage or bleeding at the site incision       - chest pain, SOB, n/v      - if you believe you were shock and then go to the ED    Portillo Russell PA-C

## 2023-02-02 ENCOUNTER — APPOINTMENT (OUTPATIENT)
Dept: ELECTROPHYSIOLOGY | Facility: CLINIC | Age: 63
End: 2023-02-02
Payer: COMMERCIAL

## 2023-02-02 VITALS — RESPIRATION RATE: 14 BRPM | SYSTOLIC BLOOD PRESSURE: 128 MMHG | HEART RATE: 70 BPM | DIASTOLIC BLOOD PRESSURE: 78 MMHG

## 2023-02-02 DIAGNOSIS — Z95.810 PRESENCE OF AUTOMATIC (IMPLANTABLE) CARDIAC DEFIBRILLATOR: ICD-10-CM

## 2023-02-02 PROBLEM — I10 ESSENTIAL (PRIMARY) HYPERTENSION: Chronic | Status: ACTIVE | Noted: 2023-01-10

## 2023-02-02 PROCEDURE — 99024 POSTOP FOLLOW-UP VISIT: CPT

## 2023-02-07 PROBLEM — Z95.810 ICD (IMPLANTABLE CARDIOVERTER-DEFIBRILLATOR) IN PLACE: Status: ACTIVE | Noted: 2018-06-11

## 2023-03-17 ENCOUNTER — APPOINTMENT (OUTPATIENT)
Dept: CARDIOLOGY | Facility: CLINIC | Age: 63
End: 2023-03-17
Payer: COMMERCIAL

## 2023-03-17 ENCOUNTER — APPOINTMENT (OUTPATIENT)
Dept: ELECTROPHYSIOLOGY | Facility: CLINIC | Age: 63
End: 2023-03-17
Payer: COMMERCIAL

## 2023-03-17 ENCOUNTER — APPOINTMENT (OUTPATIENT)
Dept: CV DIAGNOSITCS | Facility: HOSPITAL | Age: 63
End: 2023-03-17

## 2023-03-17 ENCOUNTER — NON-APPOINTMENT (OUTPATIENT)
Age: 63
End: 2023-03-17

## 2023-03-17 ENCOUNTER — OUTPATIENT (OUTPATIENT)
Dept: OUTPATIENT SERVICES | Facility: HOSPITAL | Age: 63
LOS: 1 days | End: 2023-03-17
Payer: COMMERCIAL

## 2023-03-17 VITALS
OXYGEN SATURATION: 99 % | DIASTOLIC BLOOD PRESSURE: 76 MMHG | BODY MASS INDEX: 28.5 KG/M2 | TEMPERATURE: 97.8 F | WEIGHT: 228 LBS | SYSTOLIC BLOOD PRESSURE: 118 MMHG | RESPIRATION RATE: 16 BRPM | HEART RATE: 70 BPM

## 2023-03-17 DIAGNOSIS — Z90.49 ACQUIRED ABSENCE OF OTHER SPECIFIED PARTS OF DIGESTIVE TRACT: Chronic | ICD-10-CM

## 2023-03-17 DIAGNOSIS — I42.8 OTHER CARDIOMYOPATHIES: ICD-10-CM

## 2023-03-17 DIAGNOSIS — Z95.810 PRESENCE OF AUTOMATIC (IMPLANTABLE) CARDIAC DEFIBRILLATOR: Chronic | ICD-10-CM

## 2023-03-17 PROCEDURE — 93306 TTE W/DOPPLER COMPLETE: CPT | Mod: 26

## 2023-03-17 PROCEDURE — 93284 PRGRMG EVAL IMPLANTABLE DFB: CPT

## 2023-03-17 PROCEDURE — 93000 ELECTROCARDIOGRAM COMPLETE: CPT

## 2023-03-17 PROCEDURE — 99214 OFFICE O/P EST MOD 30 MIN: CPT

## 2023-03-22 NOTE — DISCUSSION/SUMMARY
[EKG obtained to assist in diagnosis and management of assessed problem(s)] : EKG obtained to assist in diagnosis and management of assessed problem(s) [FreeTextEntry1] : Of note is that Mr. Genao had a follow up transthoracic echocardiogram performed at the time of today's office visit.  In summary, this demonstrated moderate global left ventricular systolic dysfunction (LVEF 35%).  Right ventricular size and systolic function were normal.  Valvular function was found to be normal.  There was mild diastolic dysfunction.  In summary, compared to Mr. Genao's previous study of 2/26/2021, left ventricular systolic function has improved.\par \par In summary, Mr. Genoa appears to be doing very well following a recent upgrade of his AICD device to a biventricular ICD (CRT-D).  He has returned to work and is minimally symptomatic from a cardiac standpoint.  In addition, today's transthoracic echocardiogram demonstrates an improvement in Mr. Genao's LV systolic function compared to prior to device upgrade.\par \par I encouraged Mr. Genao to continue to ambulate as tolerated and to continue with his current medical regimen.  He will follow up with me in approximately six months.

## 2023-03-22 NOTE — HISTORY OF PRESENT ILLNESS
[FreeTextEntry1] : Mr. Genao presents to the office today for a follow up visit.\par \par Mr. Genao is a 62 year old gentleman with a history of a nonischemic cardiomyopathy, congestive heart failure, left bundle branch block (LBBB), and hypertension.  In 2013, Mr. Genao underwent implantation of a Medtronic automated internal cardiac defibrillator (AICD) at [facility] in [place], [place].\par \par In addition, Mr. Genao reports having been diagnosed with COVID-19 infection in April 2020 and may have had COVID-related pneumonia at that time.  However, he remained at home and did not require hospital admission.  Mr. Genao states that he was treated with outpatient oral antibiotics.\par \par Most recently, Mr. Genao was admitted to [facility] in [place] on November 3rd, 2020 with severe abdominal pain.  He was diagnosed with appendicitis with localized peritonitis.  He was initially treated with bowel rest, IV fluids, and intravenous Zosyn antibiotic.  Mr. Genao underwent a laparoscopic appendectomy on November 6th, 2020.  Mr. Genao recovered quickly and uneventfully from the surgery and was discharged from the hospital on November 7th, 2020.\par \par Of note is that Mr. Genao had a pre-operative transthoracic echocardiogram performed at [facility] on November 3rd, 2020.  In summary, this demonstrated left ventricular dilatation (LV end-diastolic dimension 6.7 cm) with mild left ventricular systolic dysfunction (LVEF 45-50%).  Right ventricular size and systolic function were found to be normal.  Valvular function was found to be normal.\par \par Mr. Genao's most recent transthoracic echocardiogram was performed at [facility] on February 26th, 2021.  In summary, this demonstrated moderate left ventricular dilatation (LV end-diastolic dimension 6.6 cm).  There was severe global left ventricular systolic dysfunction (LVEF 23%).  There was mild mitral regurgitation.  Right ventricular size and systolic function were normal.  There was mild diastolic dysfunction.\par \par Mr. Genao's most recent AICD interrogation was performed remotely on August 17th, 2022.  In summary, this demonstrated that all measured data were within normal limits.  There were no arrhythmic events for review.  The device battery voltage was estimated at 2.64V (recommended replacement at 2.63V).\par \par Mr. Genao was evaluated in Electrophysiology in December 2022 because his AICD was approaching the elective replacement indicator (JANEY).  Because of persistent severe left ventricular systolic dysfunction in addition to an ECG demonstrating LBBB with QRS duration 155 msec, Mr. Genao was scheduled for an upgrade of his device to a biventricular AICD (CRT-D).  Mr. Genao underwent device upgrade to a biventricular AICD (CRT-D) at [facility] on January 20th, 2023.  The procedure went well and was without complications.\par \par Mr. Genao reports that he has been feeling well since undergoing device upgrade to CRT-D.  He has resumed work full time and has not experienced any difficulty with this.  In particular, there has been no chest pain or dyspnea either at rest or with exertion.  Mr. Genao denies having any palpitations, presyncope, or syncope.  There has been no orthopnea, paroxysmal nocturnal dyspnea, or edema.

## 2023-04-16 ENCOUNTER — FORM ENCOUNTER (OUTPATIENT)
Age: 63
End: 2023-04-16

## 2023-05-05 ENCOUNTER — APPOINTMENT (OUTPATIENT)
Dept: ELECTROPHYSIOLOGY | Facility: CLINIC | Age: 63
End: 2023-05-05

## 2023-06-05 NOTE — H&P ADULT - NSHPPHYSICALEXAM_GEN_ALL_CORE
GENERAL: Patient is in mild distress, began hyperventilating during interview. Pt has history of anxiety and similar panic episodes.   ENMT: Airway patent, Nasal mucosa clear. Mouth with normal mucosa.  EYES: Clear bilaterally, pupils equal, round and reactive to light.  CARDIAC: S1, S2. Pacemaker left anterior chest wall.   RESPIRATORY: Breath sounds clear and equal bilaterally.  ABDOMINAL: Soft, nondistended, hypoactive bowel sounds, TTP lower quadrants right greater than left, voluntary guarding, +rebound tenderness, -rovsing sign. Small reducible umbilical hernia. No organomegaly, no palpable mass.  MUSCULOSKELETAL: Spine appears normal, range of motion is not limited, no muscle or joint tenderness  NEUROLOGICAL: Alert and oriented, no focal deficits, no motor or sensory deficits.  EXTREMITIES: warm, dry, no edema.
05-Jun-2023

## 2023-06-22 ENCOUNTER — APPOINTMENT (OUTPATIENT)
Dept: ELECTROPHYSIOLOGY | Facility: CLINIC | Age: 63
End: 2023-06-22
Payer: COMMERCIAL

## 2023-06-22 ENCOUNTER — NON-APPOINTMENT (OUTPATIENT)
Age: 63
End: 2023-06-22

## 2023-06-22 PROCEDURE — 93296 REM INTERROG EVL PM/IDS: CPT

## 2023-06-22 PROCEDURE — 93295 DEV INTERROG REMOTE 1/2/MLT: CPT

## 2023-08-11 NOTE — PATIENT PROFILE ADULT - DO YOU FEEL LIKE HURTING YOURSELF OR OTHERS?
Pediatric Well Child Exam:  Adolescent Female > 11yo of Age      Chief Complaint   Patient presents with   • Well Adolescent     15 yr old    • School Physical       SUBJECTIVE:  Griselda Iarith Parra is a 15 year old female who presents for a well child exam.  Patient presents with mother and history obtained from patient and accompanying caregiver.     CONCERNS today and interval history:  Still worried about anxiety. Had been using marijuana daily multiple times a day and for a long time.       Patient Active Problem List   Diagnosis   • Chronic pain of left ankle   • Obesity (BMI 30.0-34.9)   • Seborrheic dermatitis of scalp   • Acne         MEDICATION:  Current Medications    LEVONORGESTREL-ETHINYL ESTRADIOL 0.1-20 MG-MCG PER TABLET    Take 1 tablet by mouth daily.        ALLERGIES:  ALLERGIES:   Allergen Reactions   • Talc RASH            TB risk factors? none       Menstruation: normal      PAST HISTORIES:  Allergies, Medications, Medical history, Surgical history, Family history reviewed and updated.    SOCIAL HISTORY:    Lives with patient and mother  High risk behaviors:  yes[x]  no[]  Gender/sexuality: cis/heter      NUTRITION:    Adequate calcium: yes[x] no[]     Adequate Fruits and vegetables:  yes[x] no[]   Adequate hydration: yes[x] no[]     Exercise 1hr/day?: none        SLEEP:    Dental Care--brushing twice a day, seeing dentist? yes    Screen time:  less than 2 hours a day? no    Anticipatory guidance and safety discussed:    helmet, firearms, smoke alarms/fire safety,     Recent PHQ 2/9 Score    PHQ 2:  PHQ 2 Score Peds PHQ 2 Score Peds PHQ 2 Interpretation   8/11/2023   2:27 PM 2 No further screening needed       PHQ 9:  PHQ 9 Score Peds PHQ 9 Score Peds PHQ 9 Interpretation   8/11/2023   2:27 PM 8 Mild Depression        REVIEW OF SYSTEMS:    All systems reviewed and negative except as documented in \"Concerns raised\".    PHYSICAL EXAM:  VITALS:    Visit Vitals  /65 (BP Location: RUE - Right  upper extremity, Patient Position: Sitting)   Pulse 83   Temp 98 °F (36.7 °C) (Temporal)   Ht 5' 2.99\" (1.6 m)   Wt 88.9 kg (196 lb)   LMP 08/03/2023   BMI 34.73 kg/m²    97 %ile (Z= 1.94) based on [facility] (Girls, 2-20 Years) weight-for-age data using vitals from 8/11/2023.  GENERAL:  Well appearing female child.  Alert, active and consolable.  SKIN: Warm, normal turgor.  No cyanosis.  No rash.  HEAD:  Normocephalic, atraumatic.    EYES:  Conjunctivae appear normal, non-injected, non-icteric, positive red reflex.  NOSE:  Appears normal without drainage.  EARS:  Normal external auditory canals. Tympanic membranes are transparent with normal landmarks.  THROAT:  Moist mucous membranes without lesions.  NECK:  Supple, no lymphadenopathy or masses.  HEART:  Regular rate and rhythm. Normal S1, S2. No murmurs, rubs, gallops.   LUNGS:  Clear to auscultation.  No wheezes, rales, rhonchi.  Normal work effort with breathing.  ABDOMEN:  Bowel sounds present. Soft, nontender. No hepatomegaly. No splenomegaly. No masses.  Breasts: 5 and Pubic Hair: 5  EXTREMITIES: Symmetrical muscle mass, strength all extremities. No effusions.   BACK: Spine straight.  No costovertebral angle tenderness.      NEUROLOGIC:  Oriented x 4. No gross lateralizing signs or focal deficients.  Normal gait. Normal deep tendon reflexes.  []  Wears glasses  []  Braces     PATIENT EDUCATION:  [x] Referenced Bright Futures Parent Handout  [x] Has/had scheduled dental exam  [x] Reviewed and discussed parent questions / concerns    ASSESSMENT:   15 year old female well child exam.  PHQ-9 Score 8 Reviewed and discussed with patient and family   Need for meningococcus vaccine  - MENINGOCOCCAL CONJUGATE MCV4O VACC (MENVEO)    Well adolescent visit    Need for vaccination    Generalized anxiety disorder  - SERVICE TO BEHAVIORAL HEALTH  --will start medication today at 25 mg daily. Disucssed potential side effects including but not limited to black box warning for  suicidal thought  Encounter before starting medication     PLAN:  All parental concerns and questions discussed.  Anticipatory guidance provided, handout/s given Safety: car, bicycle,fire,sharp objects,falls, water  Development  Diet  Discipline  Television  Analgesics/antipyretics  Sun exposure  Tobacco-free home  Dental care  Immunizations per orders.  Vaccine Information Statement for Immunizations given. Yes - Immunizations given today and vaccine counseling including benefits, risks, and adverse reactions were provided by myself during the visit.      Follow Up: Return in 4 weeks (on 9/8/2023) for Yearly well adolescent visits.        Ligia Oro MD                  no

## 2023-09-22 ENCOUNTER — NON-APPOINTMENT (OUTPATIENT)
Age: 63
End: 2023-09-22

## 2023-09-22 ENCOUNTER — APPOINTMENT (OUTPATIENT)
Dept: CARDIOLOGY | Facility: CLINIC | Age: 63
End: 2023-09-22
Payer: COMMERCIAL

## 2023-09-22 VITALS
SYSTOLIC BLOOD PRESSURE: 118 MMHG | OXYGEN SATURATION: 98 % | HEIGHT: 75 IN | HEART RATE: 65 BPM | WEIGHT: 225.13 LBS | RESPIRATION RATE: 16 BRPM | DIASTOLIC BLOOD PRESSURE: 76 MMHG | BODY MASS INDEX: 27.99 KG/M2

## 2023-09-22 PROCEDURE — 93000 ELECTROCARDIOGRAM COMPLETE: CPT

## 2023-09-22 PROCEDURE — 99214 OFFICE O/P EST MOD 30 MIN: CPT

## 2023-09-25 ENCOUNTER — APPOINTMENT (OUTPATIENT)
Dept: ELECTROPHYSIOLOGY | Facility: CLINIC | Age: 63
End: 2023-09-25
Payer: COMMERCIAL

## 2023-09-25 ENCOUNTER — NON-APPOINTMENT (OUTPATIENT)
Age: 63
End: 2023-09-25

## 2023-09-25 PROCEDURE — 93296 REM INTERROG EVL PM/IDS: CPT

## 2023-09-25 PROCEDURE — 93295 DEV INTERROG REMOTE 1/2/MLT: CPT

## 2023-11-30 RX ORDER — FUROSEMIDE 20 MG/1
20 TABLET ORAL
Qty: 180 | Refills: 3 | Status: ACTIVE | COMMUNITY
Start: 2018-01-12 | End: 1900-01-01

## 2023-11-30 RX ORDER — CARVEDILOL 25 MG/1
25 TABLET, FILM COATED ORAL
Qty: 180 | Refills: 3 | Status: ACTIVE | COMMUNITY
Start: 2018-02-23 | End: 1900-01-01

## 2023-11-30 RX ORDER — LISINOPRIL 10 MG/1
10 TABLET ORAL
Qty: 90 | Refills: 3 | Status: ACTIVE | COMMUNITY
Start: 2018-02-23 | End: 1900-01-01

## 2023-11-30 RX ORDER — SPIRONOLACTONE 25 MG/1
25 TABLET ORAL
Qty: 90 | Refills: 3 | Status: ACTIVE | COMMUNITY
Start: 2018-02-23 | End: 1900-01-01

## 2023-12-27 ENCOUNTER — APPOINTMENT (OUTPATIENT)
Dept: ELECTROPHYSIOLOGY | Facility: CLINIC | Age: 63
End: 2023-12-27
Payer: COMMERCIAL

## 2023-12-27 ENCOUNTER — NON-APPOINTMENT (OUTPATIENT)
Age: 63
End: 2023-12-27

## 2023-12-27 PROCEDURE — 93295 DEV INTERROG REMOTE 1/2/MLT: CPT

## 2023-12-27 PROCEDURE — 93296 REM INTERROG EVL PM/IDS: CPT

## 2024-05-03 ENCOUNTER — APPOINTMENT (OUTPATIENT)
Dept: CARDIOLOGY | Facility: CLINIC | Age: 64
End: 2024-05-03
Payer: COMMERCIAL

## 2024-05-03 ENCOUNTER — NON-APPOINTMENT (OUTPATIENT)
Age: 64
End: 2024-05-03

## 2024-05-03 ENCOUNTER — APPOINTMENT (OUTPATIENT)
Dept: ELECTROPHYSIOLOGY | Facility: CLINIC | Age: 64
End: 2024-05-03
Payer: COMMERCIAL

## 2024-05-03 VITALS — DIASTOLIC BLOOD PRESSURE: 54 MMHG | SYSTOLIC BLOOD PRESSURE: 95 MMHG | HEART RATE: 67 BPM

## 2024-05-03 DIAGNOSIS — I50.22 CHRONIC SYSTOLIC (CONGESTIVE) HEART FAILURE: ICD-10-CM

## 2024-05-03 DIAGNOSIS — I10 ESSENTIAL (PRIMARY) HYPERTENSION: ICD-10-CM

## 2024-05-03 DIAGNOSIS — I42.8 OTHER CARDIOMYOPATHIES: ICD-10-CM

## 2024-05-03 DIAGNOSIS — I44.7 LEFT BUNDLE-BRANCH BLOCK, UNSPECIFIED: ICD-10-CM

## 2024-05-03 DIAGNOSIS — Z95.810 PRESENCE OF AUTOMATIC (IMPLANTABLE) CARDIAC DEFIBRILLATOR: ICD-10-CM

## 2024-05-03 PROCEDURE — 93284 PRGRMG EVAL IMPLANTABLE DFB: CPT

## 2024-05-03 PROCEDURE — 99214 OFFICE O/P EST MOD 30 MIN: CPT

## 2024-05-03 PROCEDURE — G2211 COMPLEX E/M VISIT ADD ON: CPT

## 2024-05-03 PROCEDURE — 93000 ELECTROCARDIOGRAM COMPLETE: CPT

## 2024-05-10 VITALS
DIASTOLIC BLOOD PRESSURE: 54 MMHG | WEIGHT: 225 LBS | BODY MASS INDEX: 27.98 KG/M2 | SYSTOLIC BLOOD PRESSURE: 95 MMHG | HEART RATE: 67 BPM | HEIGHT: 75 IN

## 2024-05-10 PROBLEM — Z95.810 BIVENTRICULAR ICD (IMPLANTABLE CARDIOVERTER-DEFIBRILLATOR) IN PLACE: Status: ACTIVE | Noted: 2023-03-22

## 2024-05-10 PROBLEM — I10 BENIGN ESSENTIAL HTN: Status: ACTIVE | Noted: 2018-06-11

## 2024-05-10 PROBLEM — I42.8 NONISCHEMIC CARDIOMYOPATHY: Status: ACTIVE | Noted: 2018-06-11

## 2024-05-10 PROBLEM — I44.7 LEFT BUNDLE BRANCH BLOCK: Status: ACTIVE | Noted: 2018-06-11

## 2024-05-10 NOTE — DISCUSSION/SUMMARY
[EKG obtained to assist in diagnosis and management of assessed problem(s)] : EKG obtained to assist in diagnosis and management of assessed problem(s) [FreeTextEntry1] : In summary, Mr. Genao appears to be doing well from a cardiac standpoint.  He continues to maintain a very good functional capacity and has not had any recent symptoms suggestive of angina or congestive heart failure.  In addition, Mr. Genao's blood pressure is under excellent control on his current medical regimen.  Interrogation of Mr. Genao's biventricular AICD/CRT-D today demonstrates normal device function.  Mr. Genao was encouraged to continue to exercise as tolerated and was advised to continue with his current medical regimen, including carvedilol 25 mg BID, furosemide 20 mg/day, lisinopril 10 mg/day, and spironolactone 25 mg/day.  He will return to the office in approximately six months, at which time we will obtain a follow up transthoracic echocardiogram.

## 2024-05-10 NOTE — HISTORY OF PRESENT ILLNESS
[FreeTextEntry1] : Mr. Genao presents to the office today for a follow up visit.  Mr. Genao is a 63 year old gentleman with a history of a nonischemic cardiomyopathy, congestive heart failure, left bundle branch block (LBBB), and hypertension.  In 2013, Mr. Genao underwent implantation of a Medtronic automated internal cardiac defibrillator (AICD) at [facility] in [place], [place].  In addition, Mr. Genao reports having been diagnosed with COVID-19 infection in April 2020 and may have had COVID-related pneumonia at that time.  However, he remained at home and did not require hospital admission.  Mr. Genao states that he was treated with outpatient oral antibiotics.  Most recently, Mr. Genao was admitted to [facility] in [place] on November 3rd, 2020 with severe abdominal pain.  He was diagnosed with appendicitis with localized peritonitis.  He was initially treated with bowel rest, IV fluids, and intravenous Zosyn antibiotic.  Mr. Genao underwent a laparoscopic appendectomy on November 6th, 2020.  Mr. Genao recovered quickly and uneventfully from the surgery and was discharged from the hospital on November 7th, 2020.  Of note is that Mr. Genao had a pre-operative transthoracic echocardiogram performed at [facility] on November 3rd, 2020.  In summary, this demonstrated left ventricular dilatation (LV end-diastolic dimension 6.7 cm) with mild left ventricular systolic dysfunction (LVEF 45-50%).  Right ventricular size and systolic function were found to be normal.  Valvular function was found to be normal.  Mr. Genao was evaluated in Electrophysiology in December 2022 because his AICD was approaching the elective replacement indicator (JANEY).  Because of persistent severe left ventricular systolic dysfunction in addition to an ECG demonstrating LBBB with QRS duration 155 msec, Mr. Genao was scheduled for an upgrade of his device to a biventricular AICD (CRT-D).  Mr. Genao underwent device upgrade to a biventricular AICD (CRT-D) at Health [facility] on January 20th, 2023.  The procedure went well and was without complications.  Mr. Genao's most recent transthoracic echocardiogram was performed at Health system on March 17th, 2023.  In summary, this demonstrated moderate global left ventricular systolic dysfunction (LVEF 35%).  Right ventricular size and systolic function were normal.  Valvular function was found to be normal.  There was mild diastolic dysfunction.  In summary, compared to Mr.. Genao's previous study of 2/26/2021, left ventricular systolic function has improved.  Mr. Genao's most recent AICD/CRT-D interrogation took place in the device clinic on May 3rd, 2024.  In summary, this demonstrated normal biventricular function with adequate pacing and sensing thresholds.  The Optivol fluid index was within normal limits.  There was no tachyarrhythmia detection.  Mr. Genao reports that he has been feeling well since his last office visit.  He recently returned from a vacation in [place] and did not experience any difficulties while traveling.  Mr. Genao continues to walk without difficulty or limitation.  There has been no chest pain or dyspnea either at rest or with exertion.  Mr. Genao denies having any palpitations, presyncope, or syncope.  There has been no orthopnea, paroxysmal nocturnal dyspnea, or edema.  Of note is that the plan was for Mr. Genao to have a follow up transthoracic echocardiogram performed at the time of today's office visit.  However, Mr. Genao forgot to schedule the study.

## 2024-08-06 ENCOUNTER — APPOINTMENT (OUTPATIENT)
Dept: ELECTROPHYSIOLOGY | Facility: CLINIC | Age: 64
End: 2024-08-06

## 2024-08-21 ENCOUNTER — APPOINTMENT (OUTPATIENT)
Dept: ELECTROPHYSIOLOGY | Facility: CLINIC | Age: 64
End: 2024-08-21

## 2024-08-21 ENCOUNTER — NON-APPOINTMENT (OUTPATIENT)
Age: 64
End: 2024-08-21

## 2024-08-21 PROCEDURE — 93295 DEV INTERROG REMOTE 1/2/MLT: CPT

## 2024-08-21 PROCEDURE — 93296 REM INTERROG EVL PM/IDS: CPT

## 2024-11-01 ENCOUNTER — NON-APPOINTMENT (OUTPATIENT)
Age: 64
End: 2024-11-01

## 2024-11-01 ENCOUNTER — APPOINTMENT (OUTPATIENT)
Dept: CARDIOLOGY | Facility: CLINIC | Age: 64
End: 2024-11-01
Payer: COMMERCIAL

## 2024-11-01 VITALS
WEIGHT: 226 LBS | HEART RATE: 62 BPM | BODY MASS INDEX: 28.1 KG/M2 | HEIGHT: 75 IN | OXYGEN SATURATION: 98 % | SYSTOLIC BLOOD PRESSURE: 148 MMHG | DIASTOLIC BLOOD PRESSURE: 85 MMHG

## 2024-11-01 VITALS — SYSTOLIC BLOOD PRESSURE: 125 MMHG | DIASTOLIC BLOOD PRESSURE: 78 MMHG

## 2024-11-01 DIAGNOSIS — Z95.810 PRESENCE OF AUTOMATIC (IMPLANTABLE) CARDIAC DEFIBRILLATOR: ICD-10-CM

## 2024-11-01 DIAGNOSIS — I42.8 OTHER CARDIOMYOPATHIES: ICD-10-CM

## 2024-11-01 DIAGNOSIS — I10 ESSENTIAL (PRIMARY) HYPERTENSION: ICD-10-CM

## 2024-11-01 PROCEDURE — G2211 COMPLEX E/M VISIT ADD ON: CPT

## 2024-11-01 PROCEDURE — 93000 ELECTROCARDIOGRAM COMPLETE: CPT

## 2024-11-01 PROCEDURE — 99214 OFFICE O/P EST MOD 30 MIN: CPT

## 2024-11-20 ENCOUNTER — NON-APPOINTMENT (OUTPATIENT)
Age: 64
End: 2024-11-20

## 2024-11-20 ENCOUNTER — APPOINTMENT (OUTPATIENT)
Dept: ELECTROPHYSIOLOGY | Facility: CLINIC | Age: 64
End: 2024-11-20
Payer: COMMERCIAL

## 2024-11-20 PROCEDURE — 93296 REM INTERROG EVL PM/IDS: CPT

## 2024-11-20 PROCEDURE — 93295 DEV INTERROG REMOTE 1/2/MLT: CPT

## 2025-02-20 ENCOUNTER — NON-APPOINTMENT (OUTPATIENT)
Age: 65
End: 2025-02-20

## 2025-02-20 ENCOUNTER — APPOINTMENT (OUTPATIENT)
Dept: ELECTROPHYSIOLOGY | Facility: CLINIC | Age: 65
End: 2025-02-20
Payer: COMMERCIAL

## 2025-02-20 PROCEDURE — 93295 DEV INTERROG REMOTE 1/2/MLT: CPT

## 2025-02-20 PROCEDURE — 93296 REM INTERROG EVL PM/IDS: CPT

## 2025-05-14 NOTE — H&P PST ADULT - NEGATIVE OPHTHALMOLOGIC SYMPTOMS
POST-OPERATIVE INSTRUCTIONS FOR CATARACT SURGERY     1. No heavy lifting (no more than 5 pounds). No Bending at waist and No strenuous activity for one (1) week.     2. DO NOT RUB YOUR EYE!!! Wear eye protection at all times when going outdoors (glasses, sunglasses,        ect) and wear shield while sleeping/napping for the first week after surgery.      3. DO NOT GET WATER IN YOUR EYES FOR THE NEXT 7 DAYS. You may gently clean your face and you        may shower, but don't put any pressure on the eye. Ladies, no eye makeup for one (1) week after surgery.        Do not swim or get into a hot tub or pool for three (3) weeks.     4. You may experience eye irritation, aching, itching and blurred vision for several days. You may use over the         Counter PRESERVATIVE FREE Refresh or Systane as needed.  Use Tylenol or Ibuprofen (Motrin) for         Discomfort but DO NOT RUB YOUR EYE .     5. Call your doctor with any increased pain, redness, nausea, vomiting, fever, or worsening vision. Also call your doctor        with new flashes of light, many new floaters or a curtain/veil coming into your vision.                                               WASH YOUR HANDS BEFORE PUTTING IN YOUR DROPS.                                       ALLOW 5 MINUTES BETWEEN DIFFERENT DROPS.                          IF YOU HAVE ANY QUESTION OR CONCERNS:     DURING OFFICE HOURS CALL [phone]   OR AFTER HOURS / WEEKENDS CALL OR TEXT [phone]     If you are unable to reach us, call [phone] or go to the nearest Emergency Department                     no diplopia/no photophobia/no blurred vision L/no blurred vision R